# Patient Record
Sex: FEMALE | Race: WHITE | URBAN - METROPOLITAN AREA
[De-identification: names, ages, dates, MRNs, and addresses within clinical notes are randomized per-mention and may not be internally consistent; named-entity substitution may affect disease eponyms.]

---

## 2017-12-22 ENCOUNTER — INPATIENT (INPATIENT)
Facility: HOSPITAL | Age: 82
LOS: 4 days | Discharge: HOME HEALTH | End: 2017-12-27
Attending: HOSPITALIST | Admitting: HOSPITALIST
Payer: MEDICARE

## 2017-12-22 VITALS
OXYGEN SATURATION: 100 % | HEART RATE: 74 BPM | SYSTOLIC BLOOD PRESSURE: 162 MMHG | WEIGHT: 141.98 LBS | DIASTOLIC BLOOD PRESSURE: 94 MMHG | HEIGHT: 62 IN | RESPIRATION RATE: 18 BRPM

## 2017-12-22 LAB
ALBUMIN SERPL ELPH-MCNC: 3.5 G/DL — SIGNIFICANT CHANGE UP (ref 3.3–5)
ALP SERPL-CCNC: 89 U/L — SIGNIFICANT CHANGE UP (ref 40–120)
ALT FLD-CCNC: 23 U/L — SIGNIFICANT CHANGE UP (ref 12–78)
ANION GAP SERPL CALC-SCNC: 7 MMOL/L — SIGNIFICANT CHANGE UP (ref 5–17)
APTT BLD: 28.2 SEC — SIGNIFICANT CHANGE UP (ref 27.5–37.4)
AST SERPL-CCNC: 22 U/L — SIGNIFICANT CHANGE UP (ref 15–37)
BASOPHILS # BLD AUTO: 0.1 K/UL — SIGNIFICANT CHANGE UP (ref 0–0.2)
BASOPHILS NFR BLD AUTO: 0.6 % — SIGNIFICANT CHANGE UP (ref 0–2)
BILIRUB SERPL-MCNC: 0.4 MG/DL — SIGNIFICANT CHANGE UP (ref 0.2–1.2)
BUN SERPL-MCNC: 27 MG/DL — HIGH (ref 7–23)
CALCIUM SERPL-MCNC: 8.5 MG/DL — SIGNIFICANT CHANGE UP (ref 8.5–10.1)
CHLORIDE SERPL-SCNC: 107 MMOL/L — SIGNIFICANT CHANGE UP (ref 96–108)
CO2 SERPL-SCNC: 26 MMOL/L — SIGNIFICANT CHANGE UP (ref 22–31)
CREAT SERPL-MCNC: 0.97 MG/DL — SIGNIFICANT CHANGE UP (ref 0.5–1.3)
EOSINOPHIL # BLD AUTO: 0.1 K/UL — SIGNIFICANT CHANGE UP (ref 0–0.5)
EOSINOPHIL NFR BLD AUTO: 0.7 % — SIGNIFICANT CHANGE UP (ref 0–6)
GLUCOSE SERPL-MCNC: 87 MG/DL — SIGNIFICANT CHANGE UP (ref 70–99)
HCT VFR BLD CALC: 33.1 % — LOW (ref 34.5–45)
HGB BLD-MCNC: 10.4 G/DL — LOW (ref 11.5–15.5)
INR BLD: 1.08 RATIO — SIGNIFICANT CHANGE UP (ref 0.88–1.16)
LACTATE SERPL-SCNC: 1.3 MMOL/L — SIGNIFICANT CHANGE UP (ref 0.7–2)
LYMPHOCYTES # BLD AUTO: 1.5 K/UL — SIGNIFICANT CHANGE UP (ref 1–3.3)
LYMPHOCYTES # BLD AUTO: 12.1 % — LOW (ref 13–44)
MCHC RBC-ENTMCNC: 25.1 PG — LOW (ref 27–34)
MCHC RBC-ENTMCNC: 31.4 GM/DL — LOW (ref 32–36)
MCV RBC AUTO: 79.8 FL — LOW (ref 80–100)
MONOCYTES # BLD AUTO: 1 K/UL — HIGH (ref 0–0.9)
MONOCYTES NFR BLD AUTO: 8.2 % — SIGNIFICANT CHANGE UP (ref 2–14)
NEUTROPHILS # BLD AUTO: 9.8 K/UL — HIGH (ref 1.8–7.4)
NEUTROPHILS NFR BLD AUTO: 78.3 % — HIGH (ref 43–77)
PLATELET # BLD AUTO: 308 K/UL — SIGNIFICANT CHANGE UP (ref 150–400)
POTASSIUM SERPL-MCNC: 3.7 MMOL/L — SIGNIFICANT CHANGE UP (ref 3.5–5.3)
POTASSIUM SERPL-SCNC: 3.7 MMOL/L — SIGNIFICANT CHANGE UP (ref 3.5–5.3)
PROT SERPL-MCNC: 7.9 GM/DL — SIGNIFICANT CHANGE UP (ref 6–8.3)
PROTHROM AB SERPL-ACNC: 11.7 SEC — SIGNIFICANT CHANGE UP (ref 9.8–12.7)
RAPID RVP RESULT: DETECTED
RBC # BLD: 4.15 M/UL — SIGNIFICANT CHANGE UP (ref 3.8–5.2)
RBC # FLD: 14.6 % — HIGH (ref 10.3–14.5)
RSV RNA SPEC QL NAA+PROBE: DETECTED
SODIUM SERPL-SCNC: 140 MMOL/L — SIGNIFICANT CHANGE UP (ref 135–145)
WBC # BLD: 12.5 K/UL — HIGH (ref 3.8–10.5)
WBC # FLD AUTO: 12.5 K/UL — HIGH (ref 3.8–10.5)

## 2017-12-22 PROCEDURE — 99285 EMERGENCY DEPT VISIT HI MDM: CPT

## 2017-12-22 PROCEDURE — 71010: CPT | Mod: 26

## 2017-12-22 PROCEDURE — 93010 ELECTROCARDIOGRAM REPORT: CPT

## 2017-12-22 RX ORDER — AZITHROMYCIN 500 MG/1
500 TABLET, FILM COATED ORAL ONCE
Qty: 0 | Refills: 0 | Status: COMPLETED | OUTPATIENT
Start: 2017-12-22 | End: 2017-12-22

## 2017-12-22 RX ORDER — SODIUM CHLORIDE 9 MG/ML
3 INJECTION INTRAMUSCULAR; INTRAVENOUS; SUBCUTANEOUS ONCE
Qty: 0 | Refills: 0 | Status: COMPLETED | OUTPATIENT
Start: 2017-12-22 | End: 2017-12-22

## 2017-12-22 RX ORDER — PILOCARPINE HCL 4 %
1 DROPS OPHTHALMIC (EYE)
Qty: 0 | Refills: 0 | Status: DISCONTINUED | OUTPATIENT
Start: 2017-12-22 | End: 2017-12-27

## 2017-12-22 RX ORDER — LATANOPROST 0.05 MG/ML
1 SOLUTION/ DROPS OPHTHALMIC; TOPICAL AT BEDTIME
Qty: 0 | Refills: 0 | Status: DISCONTINUED | OUTPATIENT
Start: 2017-12-22 | End: 2017-12-27

## 2017-12-22 RX ORDER — IPRATROPIUM/ALBUTEROL SULFATE 18-103MCG
3 AEROSOL WITH ADAPTER (GRAM) INHALATION EVERY 6 HOURS
Qty: 0 | Refills: 0 | Status: DISCONTINUED | OUTPATIENT
Start: 2017-12-22 | End: 2017-12-27

## 2017-12-22 RX ORDER — ACETAMINOPHEN 500 MG
650 TABLET ORAL EVERY 6 HOURS
Qty: 0 | Refills: 0 | Status: DISCONTINUED | OUTPATIENT
Start: 2017-12-22 | End: 2017-12-27

## 2017-12-22 RX ORDER — ASPIRIN/CALCIUM CARB/MAGNESIUM 324 MG
81 TABLET ORAL DAILY
Qty: 0 | Refills: 0 | Status: DISCONTINUED | OUTPATIENT
Start: 2017-12-22 | End: 2017-12-27

## 2017-12-22 RX ORDER — CEFTRIAXONE 500 MG/1
1 INJECTION, POWDER, FOR SOLUTION INTRAMUSCULAR; INTRAVENOUS ONCE
Qty: 0 | Refills: 0 | Status: COMPLETED | OUTPATIENT
Start: 2017-12-22 | End: 2017-12-22

## 2017-12-22 RX ORDER — CEFTRIAXONE 500 MG/1
1000 INJECTION, POWDER, FOR SOLUTION INTRAMUSCULAR; INTRAVENOUS EVERY 24 HOURS
Qty: 0 | Refills: 0 | Status: DISCONTINUED | OUTPATIENT
Start: 2017-12-22 | End: 2017-12-27

## 2017-12-22 RX ORDER — IPRATROPIUM/ALBUTEROL SULFATE 18-103MCG
3 AEROSOL WITH ADAPTER (GRAM) INHALATION ONCE
Qty: 0 | Refills: 0 | Status: COMPLETED | OUTPATIENT
Start: 2017-12-22 | End: 2017-12-22

## 2017-12-22 RX ORDER — HEPARIN SODIUM 5000 [USP'U]/ML
5000 INJECTION INTRAVENOUS; SUBCUTANEOUS EVERY 8 HOURS
Qty: 0 | Refills: 0 | Status: DISCONTINUED | OUTPATIENT
Start: 2017-12-22 | End: 2017-12-27

## 2017-12-22 RX ORDER — AZITHROMYCIN 500 MG/1
500 TABLET, FILM COATED ORAL EVERY 24 HOURS
Qty: 0 | Refills: 0 | Status: DISCONTINUED | OUTPATIENT
Start: 2017-12-22 | End: 2017-12-27

## 2017-12-22 RX ORDER — DORZOLAMIDE HYDROCHLORIDE TIMOLOL MALEATE 20; 5 MG/ML; MG/ML
1 SOLUTION/ DROPS OPHTHALMIC
Qty: 0 | Refills: 0 | Status: DISCONTINUED | OUTPATIENT
Start: 2017-12-22 | End: 2017-12-27

## 2017-12-22 RX ORDER — SODIUM CHLORIDE 9 MG/ML
1000 INJECTION INTRAMUSCULAR; INTRAVENOUS; SUBCUTANEOUS
Qty: 0 | Refills: 0 | Status: DISCONTINUED | OUTPATIENT
Start: 2017-12-22 | End: 2017-12-25

## 2017-12-22 RX ORDER — SIMVASTATIN 20 MG/1
20 TABLET, FILM COATED ORAL AT BEDTIME
Qty: 0 | Refills: 0 | Status: DISCONTINUED | OUTPATIENT
Start: 2017-12-22 | End: 2017-12-27

## 2017-12-22 RX ORDER — CEFTRIAXONE 500 MG/1
INJECTION, POWDER, FOR SOLUTION INTRAMUSCULAR; INTRAVENOUS
Qty: 0 | Refills: 0 | Status: DISCONTINUED | OUTPATIENT
Start: 2017-12-22 | End: 2017-12-22

## 2017-12-22 RX ADMIN — Medication 40 MILLIGRAM(S): at 18:18

## 2017-12-22 RX ADMIN — CEFTRIAXONE 1000 MILLIGRAM(S): 500 INJECTION, POWDER, FOR SOLUTION INTRAMUSCULAR; INTRAVENOUS at 16:15

## 2017-12-22 RX ADMIN — LATANOPROST 1 DROP(S): 0.05 SOLUTION/ DROPS OPHTHALMIC; TOPICAL at 22:51

## 2017-12-22 RX ADMIN — CEFTRIAXONE 100 GRAM(S): 500 INJECTION, POWDER, FOR SOLUTION INTRAMUSCULAR; INTRAVENOUS at 13:43

## 2017-12-22 RX ADMIN — SODIUM CHLORIDE 50 MILLILITER(S): 9 INJECTION INTRAMUSCULAR; INTRAVENOUS; SUBCUTANEOUS at 18:17

## 2017-12-22 RX ADMIN — Medication 3 MILLILITER(S): at 20:05

## 2017-12-22 RX ADMIN — SODIUM CHLORIDE 50 MILLILITER(S): 9 INJECTION INTRAMUSCULAR; INTRAVENOUS; SUBCUTANEOUS at 22:51

## 2017-12-22 RX ADMIN — Medication 3 MILLILITER(S): at 11:53

## 2017-12-22 RX ADMIN — HEPARIN SODIUM 5000 UNIT(S): 5000 INJECTION INTRAVENOUS; SUBCUTANEOUS at 22:52

## 2017-12-22 RX ADMIN — AZITHROMYCIN 255 MILLIGRAM(S): 500 TABLET, FILM COATED ORAL at 16:15

## 2017-12-22 RX ADMIN — SODIUM CHLORIDE 3 MILLILITER(S): 9 INJECTION INTRAMUSCULAR; INTRAVENOUS; SUBCUTANEOUS at 11:49

## 2017-12-22 RX ADMIN — AZITHROMYCIN 255 MILLIGRAM(S): 500 TABLET, FILM COATED ORAL at 13:59

## 2017-12-22 RX ADMIN — Medication 125 MILLIGRAM(S): at 15:46

## 2017-12-22 RX ADMIN — SIMVASTATIN 20 MILLIGRAM(S): 20 TABLET, FILM COATED ORAL at 22:51

## 2017-12-22 NOTE — ED ADULT TRIAGE NOTE - CHIEF COMPLAINT QUOTE
Pt. to the ED BIBA C/O Difficulty Breathing- Pt. states she was recently diagnosed with respiratory infection and was placed on PO Z-Pack and Prednisone- Pt. denies CP

## 2017-12-22 NOTE — ED PROVIDER NOTE - OBJECTIVE STATEMENT
89F pmhx cancer presents to ED BIBA c/o SOB since yesterday. States being recently diagnosed with respiratory infection and placed on PO Z-Pack and Prednisone. Pt has no other complaints and denies CP, fever/chills, n/v/d and HA. PMD Sinida 89F pmhx cancer presents to ED BIBA c/o SOB since yesterday. States being recently diagnosed with respiratory infection and placed on PO Z-Pack and Prednisone. Pt has no other complaints and denies CP, fever/chills, n/v/d and HA. PMD tyrell

## 2017-12-22 NOTE — H&P ADULT - NSHPPHYSICALEXAM_GEN_ALL_CORE
PHYSICAL EXAM:    Constitutional: NAD, awake and alert, well-developed  HEENT: PERR, EOMI, Normal Hearing, MMM  Neck: Soft and supple, No LAD, No JVD  Respiratory: diminshed breath sound to left base  Cardiovascular: S1 and S2, regular rate and rhythm, no Murmurs, gallops or rubs  Gastrointestinal: Bowel Sounds present, soft, nontender, nondistended, no guarding, no rebound  Extremities: No peripheral edema  Vascular: 2+ peripheral pulses  Neurological: A/O x 3, no focal deficits  Musculoskeletal: 5/5 strength b/l upper and lower extremities  Skin: No rashes

## 2017-12-22 NOTE — H&P ADULT - NSHPLABSRESULTS_GEN_ALL_CORE
LABS: All Labs Reviewed:                        10.4   12.5  )-----------( 308      ( 22 Dec 2017 11:02 )             33.1     12-22    140  |  107  |  27<H>  ----------------------------<  87  3.7   |  26  |  0.97    Ca    8.5      22 Dec 2017 11:02    TPro  7.9  /  Alb  3.5  /  TBili  0.4  /  DBili  x   /  AST  22  /  ALT  23  /  AlkPhos  89  12-22    PT/INR - ( 22 Dec 2017 11:02 )   PT: 11.7 sec;   INR: 1.08 ratio         PTT - ( 22 Dec 2017 11:02 )  PTT:28.2 sec          Blood Culture:             < from: Xray Chest 1 View AP/PA. (12.22.17 @ 12:29) >      IMPRESSION:  Interval development of infiltrate and/or partial atelectasis in the left   lower lobe. Left lower lobe pneumonia is the consideration and clinical   correlation is recommended.    < end of copied text >

## 2017-12-22 NOTE — H&P ADULT - HISTORY OF PRESENT ILLNESS
89 yo F with pmh HTN p/w dyspnea x 1 day. Was recently seen by pmd for URI and was started on Z-pack for the same along with prednisone. No fevers, chills, nausea or vomiting.   CXR c/w LLL infiltrate and started on empiric abx in ED.     ED course: s/p rocephin and zithromax  social: no toxic habits  Fhx: nc

## 2017-12-22 NOTE — H&P ADULT - ASSESSMENT
89 yo F with pmh HTN p/w dyspnea x 1 day. Was recently seen by pmd for URI and was started on Z-pack for the same along with prednisone. No fevers, chills, nausea or vomiting.   CXR c/w LLL infiltrate and started on empiric abx in ED.         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 isolation  Blood culture  anti-pyretics, supplemental O2

## 2017-12-22 NOTE — ED ADULT NURSE NOTE - CHPI ED SYMPTOMS NEG
no body aches/no fever/no shortness of breath/no diaphoresis/no headache/no chest pain/no chills/no hemoptysis/no edema

## 2017-12-22 NOTE — ED PROVIDER NOTE - PROGRESS NOTE DETAILS
rvp + for rsv, pt with pneumonia and wheezing, needing oxygen for desturation to 92 to 94 on ra, tba

## 2017-12-23 LAB
APPEARANCE UR: CLEAR — SIGNIFICANT CHANGE UP
BACTERIA # UR AUTO: (no result)
BILIRUB UR-MCNC: NEGATIVE — SIGNIFICANT CHANGE UP
COLOR SPEC: YELLOW — SIGNIFICANT CHANGE UP
DIFF PNL FLD: NEGATIVE — SIGNIFICANT CHANGE UP
EPI CELLS # UR: (no result)
GLUCOSE UR QL: NEGATIVE MG/DL — SIGNIFICANT CHANGE UP
HCT VFR BLD CALC: 28.1 % — LOW (ref 34.5–45)
HGB BLD-MCNC: 8.9 G/DL — LOW (ref 11.5–15.5)
KETONES UR-MCNC: NEGATIVE — SIGNIFICANT CHANGE UP
LEUKOCYTE ESTERASE UR-ACNC: (no result)
MCHC RBC-ENTMCNC: 25 PG — LOW (ref 27–34)
MCHC RBC-ENTMCNC: 31.5 GM/DL — LOW (ref 32–36)
MCV RBC AUTO: 79.5 FL — LOW (ref 80–100)
NITRITE UR-MCNC: POSITIVE
PH UR: 6 — SIGNIFICANT CHANGE UP (ref 5–8)
PLATELET # BLD AUTO: 272 K/UL — SIGNIFICANT CHANGE UP (ref 150–400)
PROT UR-MCNC: NEGATIVE MG/DL — SIGNIFICANT CHANGE UP
RBC # BLD: 3.54 M/UL — LOW (ref 3.8–5.2)
RBC # FLD: 14.6 % — HIGH (ref 10.3–14.5)
RBC CASTS # UR COMP ASSIST: (no result) /HPF (ref 0–4)
SP GR SPEC: 1.01 — SIGNIFICANT CHANGE UP (ref 1.01–1.02)
UROBILINOGEN FLD QL: NEGATIVE MG/DL — SIGNIFICANT CHANGE UP
WBC # BLD: 11.3 K/UL — HIGH (ref 3.8–10.5)
WBC # FLD AUTO: 11.3 K/UL — HIGH (ref 3.8–10.5)
WBC UR QL: >50

## 2017-12-23 RX ORDER — DOCUSATE SODIUM 100 MG
100 CAPSULE ORAL
Qty: 0 | Refills: 0 | Status: DISCONTINUED | OUTPATIENT
Start: 2017-12-23 | End: 2017-12-27

## 2017-12-23 RX ADMIN — Medication 3 MILLILITER(S): at 15:03

## 2017-12-23 RX ADMIN — DORZOLAMIDE HYDROCHLORIDE TIMOLOL MALEATE 1 DROP(S): 20; 5 SOLUTION/ DROPS OPHTHALMIC at 17:06

## 2017-12-23 RX ADMIN — CEFTRIAXONE 1000 MILLIGRAM(S): 500 INJECTION, POWDER, FOR SOLUTION INTRAMUSCULAR; INTRAVENOUS at 16:30

## 2017-12-23 RX ADMIN — DORZOLAMIDE HYDROCHLORIDE TIMOLOL MALEATE 1 DROP(S): 20; 5 SOLUTION/ DROPS OPHTHALMIC at 05:31

## 2017-12-23 RX ADMIN — Medication 81 MILLIGRAM(S): at 11:19

## 2017-12-23 RX ADMIN — HEPARIN SODIUM 5000 UNIT(S): 5000 INJECTION INTRAVENOUS; SUBCUTANEOUS at 13:35

## 2017-12-23 RX ADMIN — SODIUM CHLORIDE 50 MILLILITER(S): 9 INJECTION INTRAMUSCULAR; INTRAVENOUS; SUBCUTANEOUS at 11:23

## 2017-12-23 RX ADMIN — Medication 600 MILLIGRAM(S): at 17:05

## 2017-12-23 RX ADMIN — HEPARIN SODIUM 5000 UNIT(S): 5000 INJECTION INTRAVENOUS; SUBCUTANEOUS at 05:31

## 2017-12-23 RX ADMIN — Medication 3 MILLILITER(S): at 01:07

## 2017-12-23 RX ADMIN — Medication 40 MILLIGRAM(S): at 05:30

## 2017-12-23 RX ADMIN — SIMVASTATIN 20 MILLIGRAM(S): 20 TABLET, FILM COATED ORAL at 22:45

## 2017-12-23 RX ADMIN — Medication 1 DROP(S): at 05:31

## 2017-12-23 RX ADMIN — Medication 100 MILLIGRAM(S): at 17:05

## 2017-12-23 RX ADMIN — HEPARIN SODIUM 5000 UNIT(S): 5000 INJECTION INTRAVENOUS; SUBCUTANEOUS at 22:45

## 2017-12-23 RX ADMIN — Medication 40 MILLIGRAM(S): at 17:05

## 2017-12-23 RX ADMIN — Medication 1 DROP(S): at 17:06

## 2017-12-23 RX ADMIN — Medication 3 MILLILITER(S): at 20:32

## 2017-12-23 RX ADMIN — AZITHROMYCIN 255 MILLIGRAM(S): 500 TABLET, FILM COATED ORAL at 16:30

## 2017-12-23 RX ADMIN — LATANOPROST 1 DROP(S): 0.05 SOLUTION/ DROPS OPHTHALMIC; TOPICAL at 22:47

## 2017-12-23 RX ADMIN — Medication 3 MILLILITER(S): at 08:48

## 2017-12-23 NOTE — PROGRESS NOTE ADULT - SUBJECTIVE AND OBJECTIVE BOX
CC: Cough (22 Dec 2017 16:39)    HPI:  89 yo F with pmh HTN p/w dyspnea x 1 day. Was recently seen by pmd for URI and was started on Z-pack for the same along with prednisone. No fevers, chills, nausea or vomiting.   CXR c/w LLL infiltrate and started on empiric abx in ED.     ED course: s/p rocephin and zithromax  social: no toxic habits  Fhx: nc (22 Dec 2017 15:10)    INTERVAL HPI/OVERNIGHT EVENTS:    Vital Signs Last 24 Hrs  T(C): 36.6 (23 Dec 2017 11:55), Max: 36.7 (22 Dec 2017 13:48)  T(F): 97.8 (23 Dec 2017 11:55), Max: 98 (22 Dec 2017 13:48)  HR: 78 (23 Dec 2017 11:55) (74 - 98)  BP: 160/77 (23 Dec 2017 11:55) (112/59 - 160/77)  BP(mean): --  RR: 17 (23 Dec 2017 11:55) (17 - 20)  SpO2: 97% (23 Dec 2017 11:55) (94% - 100%)  I&O's Detail    REVIEW OF SYSTEMS:    CONSTITUTIONAL: No weakness, fevers or chills  EYES/ENT: No visual changes;  No vertigo or throat pain   NECK: No pain or stiffness  RESPIRATORY: No cough, wheezing, hemoptysis; No shortness of breath  CARDIOVASCULAR: No chest pain or palpitations  GASTROINTESTINAL: No abdominal or epigastric pain. No nausea, vomiting, or hematemesis; No diarrhea or constipation. No melena or hematochezia.  GENITOURINARY: No dysuria, frequency or hematuria  NEUROLOGICAL: No numbness or weakness  SKIN: No itching, burning, rashes, or lesions   All other review of systems is negative unless indicated above.  PHYSICAL EXAM:    General: Well developed; well nourished; in no acute distress  Eyes: PERRLA, EOMI; conjunctiva and sclera clear  Head: Normocephalic; atraumatic  ENMT: No nasal discharge; airway clear  Neck: Supple; non tender; no masses  Respiratory: No wheezes, rales or rhonchi  Cardiovascular: Regular rate and rhythm. S1 and S2 Normal; No murmurs, gallops or rubs  Gastrointestinal: Soft non-tender non-distended; Normal bowel sounds  Genitourinary: No costovertebral angle tenderness  Extremities: Normal range of motion, No clubbing, cyanosis or edema  Vascular: Peripheral pulses palpable 2+ bilaterally  Neurological: Alert and oriented x4  Skin: Warm and dry. No acute rash  Lymph Nodes: No acute cervical adenopathy  Musculoskeletal: Normal gait, tone, without deformities  Psychiatric: Cooperative and appropriate                            8.9    11.3  )-----------( 272      ( 23 Dec 2017 07:32 )             28.1     22 Dec 2017 11:02    140    |  107    |  27     ----------------------------<  87     3.7     |  26     |  0.97     Ca    8.5        22 Dec 2017 11:02    TPro  7.9    /  Alb  3.5    /  TBili  0.4    /  DBili  x      /  AST  22     /  ALT  23     /  AlkPhos  89     22 Dec 2017 11:02    PT/INR - ( 22 Dec 2017 11:02 )   PT: 11.7 sec;   INR: 1.08 ratio         PTT - ( 22 Dec 2017 11:02 )  PTT:28.2 sec  CAPILLARY BLOOD GLUCOSE        LIVER FUNCTIONS - ( 22 Dec 2017 11:02 )  Alb: 3.5 g/dL / Pro: 7.9 gm/dL / ALK PHOS: 89 U/L / ALT: 23 U/L / AST: 22 U/L / GGT: x               MEDICATIONS  (STANDING):  ALBUTerol/ipratropium for Nebulization 3 milliLiter(s) Nebulizer every 6 hours  aspirin  chewable 81 milliGRAM(s) Oral daily  azithromycin  IVPB 500 milliGRAM(s) IV Intermittent every 24 hours  cefTRIAXone Injectable 1000 milliGRAM(s) IV Push every 24 hours  dorzolamide 2%/timolol 0.5% Ophthalmic Solution 1 Drop(s) Both EYES two times a day  heparin  Injectable 5000 Unit(s) SubCutaneous every 8 hours  latanoprost 0.005% Ophthalmic Solution 1 Drop(s) Both EYES at bedtime  methylPREDNISolone sodium succinate Injectable 40 milliGRAM(s) IV Push two times a day  pilocarpine 4% Solution 1 Drop(s) Both EYES two times a day  simvastatin 20 milliGRAM(s) Oral at bedtime  sodium chloride 0.9%. 1000 milliLiter(s) (50 mL/Hr) IV Continuous <Continuous>    MEDICATIONS  (PRN):  acetaminophen   Tablet 650 milliGRAM(s) Oral every 6 hours PRN For Temp greater than 38 C (100.4 F)      RADIOLOGY & ADDITIONAL TESTS: CC: Cough (22 Dec 2017 16:39)    HPI:  89 yo F with pmh HTN p/w dyspnea x 1 day. Was recently seen by pmd for URI and was started on Z-pack for the same along with prednisone. No fevers, chills, nausea or vomiting.   CXR c/w LLL infiltrate and started on empiric abx in ED.     INTERVAL HPI/ OVERNIGHT EVENTS: Chart reviewed, Pt was seen and examined, confirms history but distracted jumps on different topics, mildly confused. Still with Cough, reports overall feels little better. Afebrile     Vital Signs Last 24 Hrs  T(C): 36.6 (23 Dec 2017 11:55), Max: 36.7 (22 Dec 2017 13:48)  T(F): 97.8 (23 Dec 2017 11:55), Max: 98 (22 Dec 2017 13:48)  HR: 78 (23 Dec 2017 11:55) (74 - 98)  BP: 160/77 (23 Dec 2017 11:55) (112/59 - 160/77)  RR: 17 (23 Dec 2017 11:55) (17 - 20)  SpO2: 97% (23 Dec 2017 11:55) (94% - 100%)      REVIEW OF SYSTEMS:  All other review of systems is negative unless indicated above.      PHYSICAL EXAM:  General: Well developed; malnourished elderly female frail looking,  in no acute distress  Eyes: PERRLA, EOMI; conjunctiva and sclera clear  Head: Normocephalic; atraumatic  ENMT: No nasal discharge; airway clear  Neck: Supple; no  LAD  Respiratory: decreased BS at LLL,  scattered wheezes,  No rales or rhonchi  Cardiovascular: Regular rate and rhythm. S1 and S2 Normal; No murmurs  Gastrointestinal: Soft non-tender non-distended; Normal bowel sounds  Genitourinary: No costovertebral angle tenderness, no suprapubic tenderness   Extremities: Preserved range of motion, No  edema  Vascular: Peripheral pulses palpable 2+ bilaterally  Neurological: Alert and oriented x3,   Skin: Warm and dry. No acute rash, Chronic skin changes   Musculoskeletal: Normal  tone, without deformities  Psychiatric: Cooperative and appropriate      LABS:                  8.9    11.3  )-----------( 272      ( 23 Dec 2017 07:32 )             28.1     22 Dec 2017 11:02    140    |  107    |  27     ----------------------------<  87     3.7     |  26     |  0.97     Ca    8.5        22 Dec 2017 11:02    TPro  7.9    /  Alb  3.5    /  TBili  0.4    /  DBili  x      /  AST  22     /  ALT  23     /  AlkPhos  89     22 Dec 2017 11:0  PT/INR - ( 22 Dec 2017 11:02 )   PT: 11.7 sec;   INR: 1.08 ratio    PTT - ( 22 Dec 2017 11:02 )  PTT:28.2 sec  LIVER FUNCTIONS - ( 22 Dec 2017 11:02 )  Alb: 3.5 g/dL / Pro: 7.9 gm/dL / ALK PHOS: 89 U/L / ALT: 23 U/L / AST: 22 U/L / GGT: x               MEDICATIONS  (STANDING):  ALBUTerol/ipratropium for Nebulization 3 milliLiter(s) Nebulizer every 6 hours  aspirin  chewable 81 milliGRAM(s) Oral daily  azithromycin  IVPB 500 milliGRAM(s) IV Intermittent every 24 hours  cefTRIAXone Injectable 1000 milliGRAM(s) IV Push every 24 hours  dorzolamide 2%/timolol 0.5% Ophthalmic Solution 1 Drop(s) Both EYES two times a day  heparin  Injectable 5000 Unit(s) SubCutaneous every 8 hours  latanoprost 0.005% Ophthalmic Solution 1 Drop(s) Both EYES at bedtime  methylPREDNISolone sodium succinate Injectable 40 milliGRAM(s) IV Push two times a day  pilocarpine 4% Solution 1 Drop(s) Both EYES two times a day  simvastatin 20 milliGRAM(s) Oral at bedtime  sodium chloride 0.9%. 1000 milliLiter(s) (50 mL/Hr) IV Continuous <Continuous>    MEDICATIONS  (PRN):  acetaminophen   Tablet 650 milliGRAM(s) Oral every 6 hours PRN For Temp greater than 38 C (100.4 F)      RADIOLOGY & ADDITIONAL TESTS:    < from: Xray Chest 1 View AP/PA. (12.22.17 @ 12:29) >  EXAM:  CHEST SINGLE VIEW FRONTAL                            PROCEDURE DATE:  12/22/2017          INTERPRETATION:  Portable chest radiograph dated 12/22/17.    COMPARISON: 7/27/2014.    CLINICAL INFORMATION: SOB.    FINDINGS:    The airway is midline.  Interval development of partial atelectasis//infiltration involving the   left lower lobe. The remainder of the lungs are clear.  There is no pleural effusion or pneumothorax.   Cardiomegaly atherosclerotic aorta are again noted.  The bones are normal.   Surgical clips in the left axilla. A hiatal hernia.      IMPRESSION:  Interval development of infiltrate and/or partial atelectasis in the left   lower lobe. Left lower lobe pneumonia is the consideration and clinical   correlation is recommended.

## 2017-12-23 NOTE — PROGRESS NOTE ADULT - ASSESSMENT
89 yo F with pmh HTN p/w dyspnea x 1 day. Was recently seen by pmd for URI and was started on Z-pack for the same along with prednisone. No fevers, chills, nausea or vomiting.   CXR c/w LLL infiltrate and started on empiric abx in ED.         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 isolation  Blood culture  anti-pyretics, supplemental O2 89 yo F with pmh HTN, HLDp/w dyspnea x 1 day.     A/P:   1. Dyspnea 2/2 to CAP/ LLL PNA  with superimposed RSV Bronchitis  No signs of sepsis, not febrile  Tolerates RA at rest, monitor pulse ox  F/u BCX  Sputum Cx  Musinex for cough   C/w Azithromycin and Rocephin  Neb Tx      2. HTN  not on meds  monitor     3. HLD  c/w statin    4. Microcytic Anemia  unclear etiology  will send anemia work up  Monitor H/H       5. Hx of colon CA and Breast CA  stable       6. DVT PPXs 89 yo F with pmh HTN, HLDp/w dyspnea x 1 day.     A/P:   1. Dyspnea 2/2 to CAP/ LLL PNA  with superimposed RSV Bronchitis with Bronchospasm  No signs of sepsis, not febrile  Tolerates RA at rest, monitor pulse ox  F/u BCX  Sputum Cx  Musinex for cough   C/w Azithromycin and Rocephin  - C/w Solumedrol IV  Neb Tx      2. HTN  not on meds  monitor     3. HLD  c/w statin    4. Microcytic Anemia  unclear etiology  will send anemia work up  Monitor H/H       5. Dehydration  Monitor Oral intake and gentle IVF       6. Hx of colon CA and Breast CA  stable     7. DVT PPXs

## 2017-12-24 LAB
ANION GAP SERPL CALC-SCNC: 10 MMOL/L — SIGNIFICANT CHANGE UP (ref 5–17)
BUN SERPL-MCNC: 31 MG/DL — HIGH (ref 7–23)
CALCIUM SERPL-MCNC: 7.8 MG/DL — LOW (ref 8.5–10.1)
CHLORIDE SERPL-SCNC: 113 MMOL/L — HIGH (ref 96–108)
CO2 SERPL-SCNC: 19 MMOL/L — LOW (ref 22–31)
CREAT SERPL-MCNC: 0.89 MG/DL — SIGNIFICANT CHANGE UP (ref 0.5–1.3)
FERRITIN SERPL-MCNC: 12 NG/ML — LOW (ref 15–150)
FOLATE SERPL-MCNC: 6.2 NG/ML — SIGNIFICANT CHANGE UP (ref 4.8–24.2)
GLUCOSE SERPL-MCNC: 103 MG/DL — HIGH (ref 70–99)
HCT VFR BLD CALC: 27.1 % — LOW (ref 34.5–45)
HGB BLD-MCNC: 8.2 G/DL — LOW (ref 11.5–15.5)
IRON SATN MFR SERPL: 13 UG/DL — LOW (ref 30–160)
IRON SATN MFR SERPL: 5 % — LOW (ref 14–50)
LDH SERPL L TO P-CCNC: 161 U/L — SIGNIFICANT CHANGE UP (ref 84–241)
MCHC RBC-ENTMCNC: 24 PG — LOW (ref 27–34)
MCHC RBC-ENTMCNC: 30.4 GM/DL — LOW (ref 32–36)
MCV RBC AUTO: 79 FL — LOW (ref 80–100)
PLATELET # BLD AUTO: 258 K/UL — SIGNIFICANT CHANGE UP (ref 150–400)
POTASSIUM SERPL-MCNC: 4.2 MMOL/L — SIGNIFICANT CHANGE UP (ref 3.5–5.3)
POTASSIUM SERPL-SCNC: 4.2 MMOL/L — SIGNIFICANT CHANGE UP (ref 3.5–5.3)
RBC # BLD: 3.35 M/UL — LOW (ref 3.8–5.2)
RBC # BLD: 3.43 M/UL — LOW (ref 3.8–5.2)
RBC # FLD: 14.6 % — HIGH (ref 10.3–14.5)
RETICS #: 71.4 K/UL — SIGNIFICANT CHANGE UP (ref 25–125)
RETICS/RBC NFR: 2.1 % — SIGNIFICANT CHANGE UP (ref 0.5–2.5)
SODIUM SERPL-SCNC: 142 MMOL/L — SIGNIFICANT CHANGE UP (ref 135–145)
TIBC SERPL-MCNC: 274 UG/DL — SIGNIFICANT CHANGE UP (ref 220–430)
UIBC SERPL-MCNC: 261 UG/DL — SIGNIFICANT CHANGE UP (ref 110–370)
VIT B12 SERPL-MCNC: 254 PG/ML — SIGNIFICANT CHANGE UP (ref 232–1245)
WBC # BLD: 14 K/UL — HIGH (ref 3.8–10.5)
WBC # FLD AUTO: 14 K/UL — HIGH (ref 3.8–10.5)

## 2017-12-24 RX ORDER — FERROUS SULFATE 325(65) MG
325 TABLET ORAL
Qty: 0 | Refills: 0 | Status: DISCONTINUED | OUTPATIENT
Start: 2017-12-24 | End: 2017-12-27

## 2017-12-24 RX ORDER — PANTOPRAZOLE SODIUM 20 MG/1
40 TABLET, DELAYED RELEASE ORAL
Qty: 0 | Refills: 0 | Status: DISCONTINUED | OUTPATIENT
Start: 2017-12-24 | End: 2017-12-27

## 2017-12-24 RX ORDER — POLYETHYLENE GLYCOL 3350 17 G/17G
17 POWDER, FOR SOLUTION ORAL DAILY
Qty: 0 | Refills: 0 | Status: DISCONTINUED | OUTPATIENT
Start: 2017-12-24 | End: 2017-12-27

## 2017-12-24 RX ORDER — PREGABALIN 225 MG/1
1000 CAPSULE ORAL DAILY
Qty: 0 | Refills: 0 | Status: DISCONTINUED | OUTPATIENT
Start: 2017-12-24 | End: 2017-12-27

## 2017-12-24 RX ADMIN — Medication 1 DROP(S): at 06:13

## 2017-12-24 RX ADMIN — AZITHROMYCIN 255 MILLIGRAM(S): 500 TABLET, FILM COATED ORAL at 17:27

## 2017-12-24 RX ADMIN — Medication 325 MILLIGRAM(S): at 17:30

## 2017-12-24 RX ADMIN — POLYETHYLENE GLYCOL 3350 17 GRAM(S): 17 POWDER, FOR SOLUTION ORAL at 18:58

## 2017-12-24 RX ADMIN — HEPARIN SODIUM 5000 UNIT(S): 5000 INJECTION INTRAVENOUS; SUBCUTANEOUS at 13:42

## 2017-12-24 RX ADMIN — Medication 40 MILLIGRAM(S): at 06:12

## 2017-12-24 RX ADMIN — Medication 3 MILLILITER(S): at 19:56

## 2017-12-24 RX ADMIN — Medication 81 MILLIGRAM(S): at 11:27

## 2017-12-24 RX ADMIN — PREGABALIN 1000 MICROGRAM(S): 225 CAPSULE ORAL at 18:59

## 2017-12-24 RX ADMIN — SODIUM CHLORIDE 50 MILLILITER(S): 9 INJECTION INTRAMUSCULAR; INTRAVENOUS; SUBCUTANEOUS at 08:25

## 2017-12-24 RX ADMIN — Medication 1 DROP(S): at 17:32

## 2017-12-24 RX ADMIN — SIMVASTATIN 20 MILLIGRAM(S): 20 TABLET, FILM COATED ORAL at 23:06

## 2017-12-24 RX ADMIN — Medication 40 MILLIGRAM(S): at 17:31

## 2017-12-24 RX ADMIN — DORZOLAMIDE HYDROCHLORIDE TIMOLOL MALEATE 1 DROP(S): 20; 5 SOLUTION/ DROPS OPHTHALMIC at 06:14

## 2017-12-24 RX ADMIN — Medication 600 MILLIGRAM(S): at 17:30

## 2017-12-24 RX ADMIN — Medication 3 MILLILITER(S): at 14:16

## 2017-12-24 RX ADMIN — Medication 3 MILLILITER(S): at 01:42

## 2017-12-24 RX ADMIN — Medication 3 MILLILITER(S): at 09:17

## 2017-12-24 RX ADMIN — Medication 100 MILLIGRAM(S): at 17:30

## 2017-12-24 RX ADMIN — LATANOPROST 1 DROP(S): 0.05 SOLUTION/ DROPS OPHTHALMIC; TOPICAL at 23:35

## 2017-12-24 RX ADMIN — CEFTRIAXONE 1000 MILLIGRAM(S): 500 INJECTION, POWDER, FOR SOLUTION INTRAMUSCULAR; INTRAVENOUS at 17:30

## 2017-12-24 RX ADMIN — DORZOLAMIDE HYDROCHLORIDE TIMOLOL MALEATE 1 DROP(S): 20; 5 SOLUTION/ DROPS OPHTHALMIC at 17:32

## 2017-12-24 RX ADMIN — Medication 600 MILLIGRAM(S): at 06:11

## 2017-12-24 RX ADMIN — HEPARIN SODIUM 5000 UNIT(S): 5000 INJECTION INTRAVENOUS; SUBCUTANEOUS at 23:05

## 2017-12-24 RX ADMIN — HEPARIN SODIUM 5000 UNIT(S): 5000 INJECTION INTRAVENOUS; SUBCUTANEOUS at 06:12

## 2017-12-24 NOTE — PROGRESS NOTE ADULT - ASSESSMENT
89 yo F with pmh HTN, HLDp/w dyspnea x 1 day.     A/P:   1. Dyspnea 2/2 to CAP/ LLL PNA  with superimposed RSV Bronchitis with Bronchospasm  No signs of sepsis, not febrile  Tolerates RA at rest, monitor pulse ox  F/u BCX  Sputum Cx  Musinex for cough   C/w Azithromycin and Rocephin  - C/w Solumedrol IV  Neb Tx      2. HTN  not on meds  monitor     3. HLD  c/w statin    4. Microcytic Anemia  unclear etiology  will send anemia work up  Monitor H/H       5. Dehydration  Monitor Oral intake and gentle IVF       6. Hx of colon CA and Breast CA  stable     7. DVT PPXs 89 yo F with pmh HTN, HLDp/w dyspnea x 1 day.     A/P:   1. Dyspnea 2/2 to CAP/ LLL PNA  with superimposed RSV Bronchitis with Bronchospasm  No signs of sepsis, not febrile  Leukocytosis  now likely due to steroids   Tolerates RA at rest, monitor pulse ox  F/u BCX: NGTD  Sputum Cx  Musinex for cough   C/w Azithromycin and Rocephin  C/w Solumedrol IV  Neb Tx      2. HTN  not on meds  BP elevated, will start low dose amlodipine     3. HLD  c/w statin    4. Microcytic Anemia  unclear etiology  anemia work up sent   HAs B12 low, Iron studies with mixed picture   Monitor H/H  closely  Occult blood pending       5. Dehydration  Monitor Oral intake and gentle IVF       6. Hx of colon CA and Breast CA  stable     7. DVT PPXs 89 yo F with pmh HTN, HLDp/w dyspnea x 1 day.     A/P:   1. Dyspnea 2/2 to CAP/ LLL PNA  with superimposed RSV Bronchitis with Bronchospasm  No signs of sepsis, not febrile  Leukocytosis  now likely due to steroids   Tolerates RA at rest, monitor pulse ox  F/u BCX: NGTD  Sputum Cx  Musinex for cough   C/w Azithromycin and Rocephin  C/w Solumedrol IV  Neb Tx      2. HTN  not on meds  BP elevated, will start low dose amlodipine     3. HLD  c/w statin    4. Microcytic Anemia  unclear etiology  anemia work up sent   HAs B12 low, Iron studies with mixed picture   Monitor H/H  closely  Occult blood pending       5. Dehydration  Monitor Oral intake and gentle IVF       6. Hx of colon CA and Breast CA  stable     7.  Leukocytosis  Viral vs UTI? vs 2/2 steroids  Monitor for fevers  C/w ABxs  Will start taper down steroids     8. DVT PPXs

## 2017-12-24 NOTE — PROGRESS NOTE ADULT - SUBJECTIVE AND OBJECTIVE BOX
CC: Cough (22 Dec 2017 16:39)    HPI:  89 yo F with pmh HTN p/w dyspnea x 1 day. Was recently seen by pmd for URI and was started on Z-pack for the same along with prednisone. No fevers, chills, nausea or vomiting.   CXR c/w LLL infiltrate and started on empiric abx in ED.     INTERVAL HPI/ OVERNIGHT EVENTS: Chart reviewed, Pt was seen and examined, confirms history but distracted jumps on different topics, mildly confused. Still with Cough, reports overall feels little better. Afebrile     Vital Signs Last 24 Hrs  T(C): 36.6 (23 Dec 2017 11:55), Max: 36.7 (22 Dec 2017 13:48)  T(F): 97.8 (23 Dec 2017 11:55), Max: 98 (22 Dec 2017 13:48)  HR: 78 (23 Dec 2017 11:55) (74 - 98)  BP: 160/77 (23 Dec 2017 11:55) (112/59 - 160/77)  RR: 17 (23 Dec 2017 11:55) (17 - 20)  SpO2: 97% (23 Dec 2017 11:55) (94% - 100%)      REVIEW OF SYSTEMS:  All other review of systems is negative unless indicated above.      PHYSICAL EXAM:  General: Well developed; malnourished elderly female frail looking,  in no acute distress  Eyes: PERRLA, EOMI; conjunctiva and sclera clear  Head: Normocephalic; atraumatic  ENMT: No nasal discharge; airway clear  Neck: Supple; no  LAD  Respiratory: decreased BS at LLL,  scattered wheezes,  No rales or rhonchi  Cardiovascular: Regular rate and rhythm. S1 and S2 Normal; No murmurs  Gastrointestinal: Soft non-tender non-distended; Normal bowel sounds  Genitourinary: No costovertebral angle tenderness, no suprapubic tenderness   Extremities: Preserved range of motion, No  edema  Vascular: Peripheral pulses palpable 2+ bilaterally  Neurological: Alert and oriented x3,   Skin: Warm and dry. No acute rash, Chronic skin changes   Musculoskeletal: Normal  tone, without deformities  Psychiatric: Cooperative and appropriate      LABS:                  8.9    11.3  )-----------( 272      ( 23 Dec 2017 07:32 )             28.1     22 Dec 2017 11:02    140    |  107    |  27     ----------------------------<  87     3.7     |  26     |  0.97     Ca    8.5        22 Dec 2017 11:02    TPro  7.9    /  Alb  3.5    /  TBili  0.4    /  DBili  x      /  AST  22     /  ALT  23     /  AlkPhos  89     22 Dec 2017 11:0  PT/INR - ( 22 Dec 2017 11:02 )   PT: 11.7 sec;   INR: 1.08 ratio    PTT - ( 22 Dec 2017 11:02 )  PTT:28.2 sec  LIVER FUNCTIONS - ( 22 Dec 2017 11:02 )  Alb: 3.5 g/dL / Pro: 7.9 gm/dL / ALK PHOS: 89 U/L / ALT: 23 U/L / AST: 22 U/L / GGT: x               MEDICATIONS  (STANDING):  ALBUTerol/ipratropium for Nebulization 3 milliLiter(s) Nebulizer every 6 hours  aspirin  chewable 81 milliGRAM(s) Oral daily  azithromycin  IVPB 500 milliGRAM(s) IV Intermittent every 24 hours  cefTRIAXone Injectable 1000 milliGRAM(s) IV Push every 24 hours  dorzolamide 2%/timolol 0.5% Ophthalmic Solution 1 Drop(s) Both EYES two times a day  heparin  Injectable 5000 Unit(s) SubCutaneous every 8 hours  latanoprost 0.005% Ophthalmic Solution 1 Drop(s) Both EYES at bedtime  methylPREDNISolone sodium succinate Injectable 40 milliGRAM(s) IV Push two times a day  pilocarpine 4% Solution 1 Drop(s) Both EYES two times a day  simvastatin 20 milliGRAM(s) Oral at bedtime  sodium chloride 0.9%. 1000 milliLiter(s) (50 mL/Hr) IV Continuous <Continuous>    MEDICATIONS  (PRN):  acetaminophen   Tablet 650 milliGRAM(s) Oral every 6 hours PRN For Temp greater than 38 C (100.4 F)      RADIOLOGY & ADDITIONAL TESTS:    < from: Xray Chest 1 View AP/PA. (12.22.17 @ 12:29) >  EXAM:  CHEST SINGLE VIEW FRONTAL                            PROCEDURE DATE:  12/22/2017          INTERPRETATION:  Portable chest radiograph dated 12/22/17.    COMPARISON: 7/27/2014.    CLINICAL INFORMATION: SOB.    FINDINGS:    The airway is midline.  Interval development of partial atelectasis//infiltration involving the   left lower lobe. The remainder of the lungs are clear.  There is no pleural effusion or pneumothorax.   Cardiomegaly atherosclerotic aorta are again noted.  The bones are normal.   Surgical clips in the left axilla. A hiatal hernia.      IMPRESSION:  Interval development of infiltrate and/or partial atelectasis in the left   lower lobe. Left lower lobe pneumonia is the consideration and clinical   correlation is recommended. CC: Cough (22 Dec 2017 16:39)    HPI:  89 yo F with pmh HTN p/w dyspnea x 1 day. Was recently seen by pmd for URI and was started on Z-pack for the same along with prednisone. No fevers, chills, nausea or vomiting.   CXR c/w LLL infiltrate and started on empiric abx in ED.     INTERVAL HPI/ OVERNIGHT EVENTS: Pt was seen and examined,  overall better, still with cough, no fevers.     Vital Signs Last 24 Hrs  T(C): 36.6 (23 Dec 2017 11:55), Max: 36.7 (22 Dec 2017 13:48)  T(F): 97.8 (23 Dec 2017 11:55), Max: 98 (22 Dec 2017 13:48)  HR: 78 (23 Dec 2017 11:55) (74 - 98)  BP: 160/77 (23 Dec 2017 11:55) (112/59 - 160/77)  RR: 17 (23 Dec 2017 11:55) (17 - 20)  SpO2: 97% (23 Dec 2017 11:55) (94% - 100%)      REVIEW OF SYSTEMS:  All other review of systems is negative unless indicated above.      PHYSICAL EXAM:  General: Well developed; malnourished elderly female frail looking,  in no acute distress  Eyes: PERRLA, EOMI; conjunctiva and sclera clear  Head: Normocephalic; atraumatic  ENMT: No nasal discharge; airway clear  Neck: Supple; no  LAD  Respiratory: decreased BS at LLL,  coarse sounds,  scattered wheezes,  No rales or rhonchi  Cardiovascular: Regular rate and rhythm. S1 and S2 Normal; No murmurs  Gastrointestinal: Soft non-tender non-distended; Normal bowel sounds  Genitourinary: No costovertebral angle tenderness, no suprapubic tenderness   Extremities: Preserved range of motion, No  edema  Vascular: Peripheral pulses palpable 2+ bilaterally  Neurological: Alert and oriented x3  Skin: Warm and dry. No acute rash, Chronic skin changes   Musculoskeletal: Normal  tone, without deformities  Psychiatric: Cooperative and appropriate      LABS:                             8.2    14.0  )-----------( 258      ( 24 Dec 2017 08:26 )             27.1     12-24    142  |  113<H>  |  31<H>  ----------------------------<  103<H>  4.2   |  19<L>  |  0.89    Ca    7.8<L>      24 Dec 2017 08:26    Urinalysis Basic - ( 23 Dec 2017 21:45 )    Color: Yellow / Appearance: Clear / S.015 / pH: x  Gluc: x / Ketone: Negative  / Bili: Negative / Urobili: Negative mg/dL   Blood: x / Protein: Negative mg/dL / Nitrite: Positive   Leuk Esterase: Small / RBC: 3-5 /HPF / WBC >50   Sq Epi: x / Non Sq Epi: Moderate / Bacteria: Moderate                 8.9    11.3  )-----------( 272      ( 23 Dec 2017 07:32 )             28.1     22 Dec 2017 11:02    140    |  107    |  27     ----------------------------<  87     3.7     |  26     |  0.97     Ca    8.5        22 Dec 2017 11:02    TPro  7.9    /  Alb  3.5    /  TBili  0.4    /  DBili  x      /  AST  22     /  ALT  23     /  AlkPhos  89     22 Dec 2017 11:0  PT/INR - ( 22 Dec 2017 11:02 )   PT: 11.7 sec;   INR: 1.08 ratio    PTT - ( 22 Dec 2017 11:02 )  PTT:28.2 sec  LIVER FUNCTIONS - ( 22 Dec 2017 11:02 )  Alb: 3.5 g/dL / Pro: 7.9 gm/dL / ALK PHOS: 89 U/L / ALT: 23 U/L / AST: 22 U/L / GGT: x           Culture - Blood (17 @ 12:22)    Specimen Source: .Blood None    Culture Results:   No growth to date.    Culture - Blood (17 @ 11:02)    Specimen Source: .Blood Blood-Peripheral    Culture Results:   No growth to date.      MEDICATIONS  (STANDING):  ALBUTerol/ipratropium for Nebulization 3 milliLiter(s) Nebulizer every 6 hours  aspirin  chewable 81 milliGRAM(s) Oral daily  azithromycin  IVPB 500 milliGRAM(s) IV Intermittent every 24 hours  cefTRIAXone Injectable 1000 milliGRAM(s) IV Push every 24 hours  dorzolamide 2%/timolol 0.5% Ophthalmic Solution 1 Drop(s) Both EYES two times a day  guaiFENesin  milliGRAM(s) Oral every 12 hours  heparin  Injectable 5000 Unit(s) SubCutaneous every 8 hours  latanoprost 0.005% Ophthalmic Solution 1 Drop(s) Both EYES at bedtime  methylPREDNISolone sodium succinate Injectable 40 milliGRAM(s) IV Push two times a day  pilocarpine 4% Solution 1 Drop(s) Both EYES two times a day  simvastatin 20 milliGRAM(s) Oral at bedtime  sodium chloride 0.9%. 1000 milliLiter(s) (50 mL/Hr) IV Continuous <Continuous>    MEDICATIONS  (PRN):  acetaminophen   Tablet 650 milliGRAM(s) Oral every 6 hours PRN For Temp greater than 38 C (100.4 F)  docusate sodium 100 milliGRAM(s) Oral two times a day PRN Constipation  polyethylene glycol 3350 17 Gram(s) Oral daily PRN Constipation        RADIOLOGY & ADDITIONAL TESTS:    EXAM:  CHEST SINGLE VIEW FRONTAL                        PROCEDURE DATE:  2017      FINDINGS:    The airway is midline.  Interval development of partial atelectasis//infiltration involving the   left lower lobe. The remainder of the lungs are clear.  There is no pleural effusion or pneumothorax.   Cardiomegaly atherosclerotic aorta are again noted.  The bones are normal.   Surgical clips in the left axilla. A hiatal hernia.      IMPRESSION:  Interval development of infiltrate and/or partial atelectasis in the left   lower lobe. Left lower lobe pneumonia is the consideration and clinical   correlation is recommended.

## 2017-12-25 LAB
ANION GAP SERPL CALC-SCNC: 11 MMOL/L — SIGNIFICANT CHANGE UP (ref 5–17)
BUN SERPL-MCNC: 27 MG/DL — HIGH (ref 7–23)
CALCIUM SERPL-MCNC: 8.1 MG/DL — LOW (ref 8.5–10.1)
CHLORIDE SERPL-SCNC: 112 MMOL/L — HIGH (ref 96–108)
CO2 SERPL-SCNC: 19 MMOL/L — LOW (ref 22–31)
CREAT SERPL-MCNC: 0.94 MG/DL — SIGNIFICANT CHANGE UP (ref 0.5–1.3)
CULTURE RESULTS: SIGNIFICANT CHANGE UP
GLUCOSE SERPL-MCNC: 127 MG/DL — HIGH (ref 70–99)
HCT VFR BLD CALC: 29.4 % — LOW (ref 34.5–45)
HGB BLD-MCNC: 9.1 G/DL — LOW (ref 11.5–15.5)
MCHC RBC-ENTMCNC: 24.4 PG — LOW (ref 27–34)
MCHC RBC-ENTMCNC: 30.9 GM/DL — LOW (ref 32–36)
MCV RBC AUTO: 78.9 FL — LOW (ref 80–100)
PLATELET # BLD AUTO: 321 K/UL — SIGNIFICANT CHANGE UP (ref 150–400)
POTASSIUM SERPL-MCNC: 3.6 MMOL/L — SIGNIFICANT CHANGE UP (ref 3.5–5.3)
POTASSIUM SERPL-SCNC: 3.6 MMOL/L — SIGNIFICANT CHANGE UP (ref 3.5–5.3)
RBC # BLD: 3.72 M/UL — LOW (ref 3.8–5.2)
RBC # FLD: 14.9 % — HIGH (ref 10.3–14.5)
SODIUM SERPL-SCNC: 142 MMOL/L — SIGNIFICANT CHANGE UP (ref 135–145)
SPECIMEN SOURCE: SIGNIFICANT CHANGE UP
WBC # BLD: 19.3 K/UL — HIGH (ref 3.8–10.5)
WBC # FLD AUTO: 19.3 K/UL — HIGH (ref 3.8–10.5)

## 2017-12-25 RX ORDER — AMLODIPINE BESYLATE 2.5 MG/1
5 TABLET ORAL DAILY
Qty: 0 | Refills: 0 | Status: DISCONTINUED | OUTPATIENT
Start: 2017-12-25 | End: 2017-12-27

## 2017-12-25 RX ORDER — LANOLIN ALCOHOL/MO/W.PET/CERES
3 CREAM (GRAM) TOPICAL AT BEDTIME
Qty: 0 | Refills: 0 | Status: DISCONTINUED | OUTPATIENT
Start: 2017-12-25 | End: 2017-12-27

## 2017-12-25 RX ORDER — LIDOCAINE 4 G/100G
1 CREAM TOPICAL DAILY
Qty: 0 | Refills: 0 | Status: DISCONTINUED | OUTPATIENT
Start: 2017-12-25 | End: 2017-12-27

## 2017-12-25 RX ADMIN — CEFTRIAXONE 1000 MILLIGRAM(S): 500 INJECTION, POWDER, FOR SOLUTION INTRAMUSCULAR; INTRAVENOUS at 17:23

## 2017-12-25 RX ADMIN — PANTOPRAZOLE SODIUM 40 MILLIGRAM(S): 20 TABLET, DELAYED RELEASE ORAL at 06:26

## 2017-12-25 RX ADMIN — Medication 600 MILLIGRAM(S): at 06:26

## 2017-12-25 RX ADMIN — HEPARIN SODIUM 5000 UNIT(S): 5000 INJECTION INTRAVENOUS; SUBCUTANEOUS at 14:29

## 2017-12-25 RX ADMIN — Medication 600 MILLIGRAM(S): at 17:32

## 2017-12-25 RX ADMIN — AMLODIPINE BESYLATE 5 MILLIGRAM(S): 2.5 TABLET ORAL at 14:28

## 2017-12-25 RX ADMIN — Medication 325 MILLIGRAM(S): at 17:32

## 2017-12-25 RX ADMIN — SODIUM CHLORIDE 50 MILLILITER(S): 9 INJECTION INTRAMUSCULAR; INTRAVENOUS; SUBCUTANEOUS at 06:25

## 2017-12-25 RX ADMIN — DORZOLAMIDE HYDROCHLORIDE TIMOLOL MALEATE 1 DROP(S): 20; 5 SOLUTION/ DROPS OPHTHALMIC at 17:30

## 2017-12-25 RX ADMIN — Medication 81 MILLIGRAM(S): at 14:28

## 2017-12-25 RX ADMIN — HEPARIN SODIUM 5000 UNIT(S): 5000 INJECTION INTRAVENOUS; SUBCUTANEOUS at 23:31

## 2017-12-25 RX ADMIN — PREGABALIN 1000 MICROGRAM(S): 225 CAPSULE ORAL at 14:28

## 2017-12-25 RX ADMIN — DORZOLAMIDE HYDROCHLORIDE TIMOLOL MALEATE 1 DROP(S): 20; 5 SOLUTION/ DROPS OPHTHALMIC at 06:25

## 2017-12-25 RX ADMIN — HEPARIN SODIUM 5000 UNIT(S): 5000 INJECTION INTRAVENOUS; SUBCUTANEOUS at 06:26

## 2017-12-25 RX ADMIN — LATANOPROST 1 DROP(S): 0.05 SOLUTION/ DROPS OPHTHALMIC; TOPICAL at 23:32

## 2017-12-25 RX ADMIN — SIMVASTATIN 20 MILLIGRAM(S): 20 TABLET, FILM COATED ORAL at 23:31

## 2017-12-25 RX ADMIN — Medication 40 MILLIGRAM(S): at 06:26

## 2017-12-25 RX ADMIN — Medication 1 DROP(S): at 10:09

## 2017-12-25 RX ADMIN — AZITHROMYCIN 255 MILLIGRAM(S): 500 TABLET, FILM COATED ORAL at 17:24

## 2017-12-25 RX ADMIN — Medication 3 MILLILITER(S): at 13:34

## 2017-12-25 RX ADMIN — Medication 1 DROP(S): at 17:30

## 2017-12-25 NOTE — PROGRESS NOTE ADULT - ASSESSMENT
89 yo F with pmh HTN, HLDp/w dyspnea x 1 day.     A/P:   1. Dyspnea 2/2 to CAP/ LLL PNA  with superimposed RSV Bronchitis with Bronchospasm  No signs of sepsis, not febrile  Leukocytosis  now likely due to steroids   Tolerates RA at rest, monitor pulse ox  F/u BCX: NGTD  Sputum Cx  Musinex for cough   C/w Azithromycin and Rocephin  C/w Solumedrol IV  Neb Tx      2. HTN  not on meds  BP elevated, will start low dose amlodipine     3. HLD  c/w statin    4. Microcytic Anemia  unclear etiology  anemia work up sent   HAs B12 low, Iron studies with mixed picture   Monitor H/H  closely  Occult blood pending       5. Dehydration  Monitor Oral intake and gentle IVF       6. Hx of colon CA and Breast CA  stable     7.  Leukocytosis  Viral vs UTI? vs 2/2 steroids  Monitor for fevers  C/w ABxs  Will start taper down steroids     8. DVT PPXs 89 yo F with pmh HTN, HLDp/w dyspnea x 1 day.     A/P:   1. Dyspnea 2/2 to CAP/ LLL PNA  with superimposed RSV Bronchitis with Bronchospasm  No signs of sepsis, not febrile  Leukocytosis  now likely due to steroids, monitor for fevers and signs of infection, titrate down steroids   Tolerates RA at rest, monitor pulse ox  F/u BCX: NGTD  Sputum Cx  Musinex for cough, add tessalon pearls PRN  C/w Azithromycin and Rocephin  On  Solumedrol IV, switch to PO prednisone taper   Neb Tx      2. HTN  not on meds, reports no H/o elevated  BP   Start low dose amlodipine     3. HLD  c/w statin    4. Microcytic Anemia  unclear etiology  anemia work up sent   HAs B12 low,, start supplementation  Iron studies with mixed picture   Monitor H/H  closely  Occult blood pending       5. Dehydration  Monitor Oral intake   D/w  IVF       6. Hx of colon CA and Breast CA  stable     7.  Leukocytosis  Viral vs UTI? vs 2/2 steroids  Monitor for fevers  C/w ABxs  UCX>3 organism   start taper down steroids   If persists consider Ct chest     8. DVT PPXs     Dispo: PT [place], SW for d/c planning

## 2017-12-25 NOTE — PROGRESS NOTE ADULT - SUBJECTIVE AND OBJECTIVE BOX
CC: Cough (22 Dec 2017 16:39)    HPI:  89 yo F with pmh HTN p/w dyspnea x 1 day. Was recently seen by pmd for URI and was started on Z-pack for the same along with prednisone. No fevers, chills, nausea or vomiting.   CXR c/w LLL infiltrate and started on empiric abx in ED.     INTERVAL HPI/ OVERNIGHT EVENTS: Pt was seen and examined,  overall better, still with cough, no fevers.     Vital Signs Last 24 Hrs  T(C): 36.6 (23 Dec 2017 11:55), Max: 36.7 (22 Dec 2017 13:48)  T(F): 97.8 (23 Dec 2017 11:55), Max: 98 (22 Dec 2017 13:48)  HR: 78 (23 Dec 2017 11:55) (74 - 98)  BP: 160/77 (23 Dec 2017 11:55) (112/59 - 160/77)  RR: 17 (23 Dec 2017 11:55) (17 - 20)  SpO2: 97% (23 Dec 2017 11:55) (94% - 100%)      REVIEW OF SYSTEMS:  All other review of systems is negative unless indicated above.      PHYSICAL EXAM:  General: Well developed; malnourished elderly female frail looking,  in no acute distress  Eyes: PERRLA, EOMI; conjunctiva and sclera clear  Head: Normocephalic; atraumatic  ENMT: No nasal discharge; airway clear  Neck: Supple; no  LAD  Respiratory: decreased BS at LLL,  coarse sounds,  scattered wheezes,  No rales or rhonchi  Cardiovascular: Regular rate and rhythm. S1 and S2 Normal; No murmurs  Gastrointestinal: Soft non-tender non-distended; Normal bowel sounds  Genitourinary: No costovertebral angle tenderness, no suprapubic tenderness   Extremities: Preserved range of motion, No  edema  Vascular: Peripheral pulses palpable 2+ bilaterally  Neurological: Alert and oriented x3  Skin: Warm and dry. No acute rash, Chronic skin changes   Musculoskeletal: Normal  tone, without deformities  Psychiatric: Cooperative and appropriate      LABS:                             8.2    14.0  )-----------( 258      ( 24 Dec 2017 08:26 )             27.1     12-24    142  |  113<H>  |  31<H>  ----------------------------<  103<H>  4.2   |  19<L>  |  0.89    Ca    7.8<L>      24 Dec 2017 08:26    Urinalysis Basic - ( 23 Dec 2017 21:45 )    Color: Yellow / Appearance: Clear / S.015 / pH: x  Gluc: x / Ketone: Negative  / Bili: Negative / Urobili: Negative mg/dL   Blood: x / Protein: Negative mg/dL / Nitrite: Positive   Leuk Esterase: Small / RBC: 3-5 /HPF / WBC >50   Sq Epi: x / Non Sq Epi: Moderate / Bacteria: Moderate                 8.9    11.3  )-----------( 272      ( 23 Dec 2017 07:32 )             28.1     22 Dec 2017 11:02    140    |  107    |  27     ----------------------------<  87     3.7     |  26     |  0.97     Ca    8.5        22 Dec 2017 11:02    TPro  7.9    /  Alb  3.5    /  TBili  0.4    /  DBili  x      /  AST  22     /  ALT  23     /  AlkPhos  89     22 Dec 2017 11:0  PT/INR - ( 22 Dec 2017 11:02 )   PT: 11.7 sec;   INR: 1.08 ratio    PTT - ( 22 Dec 2017 11:02 )  PTT:28.2 sec  LIVER FUNCTIONS - ( 22 Dec 2017 11:02 )  Alb: 3.5 g/dL / Pro: 7.9 gm/dL / ALK PHOS: 89 U/L / ALT: 23 U/L / AST: 22 U/L / GGT: x           Culture - Blood (17 @ 12:22)    Specimen Source: .Blood None    Culture Results:   No growth to date.    Culture - Blood (17 @ 11:02)    Specimen Source: .Blood Blood-Peripheral    Culture Results:   No growth to date.      MEDICATIONS  (STANDING):  ALBUTerol/ipratropium for Nebulization 3 milliLiter(s) Nebulizer every 6 hours  aspirin  chewable 81 milliGRAM(s) Oral daily  azithromycin  IVPB 500 milliGRAM(s) IV Intermittent every 24 hours  cefTRIAXone Injectable 1000 milliGRAM(s) IV Push every 24 hours  dorzolamide 2%/timolol 0.5% Ophthalmic Solution 1 Drop(s) Both EYES two times a day  guaiFENesin  milliGRAM(s) Oral every 12 hours  heparin  Injectable 5000 Unit(s) SubCutaneous every 8 hours  latanoprost 0.005% Ophthalmic Solution 1 Drop(s) Both EYES at bedtime  methylPREDNISolone sodium succinate Injectable 40 milliGRAM(s) IV Push two times a day  pilocarpine 4% Solution 1 Drop(s) Both EYES two times a day  simvastatin 20 milliGRAM(s) Oral at bedtime  sodium chloride 0.9%. 1000 milliLiter(s) (50 mL/Hr) IV Continuous <Continuous>    MEDICATIONS  (PRN):  acetaminophen   Tablet 650 milliGRAM(s) Oral every 6 hours PRN For Temp greater than 38 C (100.4 F)  docusate sodium 100 milliGRAM(s) Oral two times a day PRN Constipation  polyethylene glycol 3350 17 Gram(s) Oral daily PRN Constipation        RADIOLOGY & ADDITIONAL TESTS:    EXAM:  CHEST SINGLE VIEW FRONTAL                        PROCEDURE DATE:  2017      FINDINGS:    The airway is midline.  Interval development of partial atelectasis//infiltration involving the   left lower lobe. The remainder of the lungs are clear.  There is no pleural effusion or pneumothorax.   Cardiomegaly atherosclerotic aorta are again noted.  The bones are normal.   Surgical clips in the left axilla. A hiatal hernia.      IMPRESSION:  Interval development of infiltrate and/or partial atelectasis in the left   lower lobe. Left lower lobe pneumonia is the consideration and clinical   correlation is recommended. CC: Cough (22 Dec 2017 16:39)    HPI:  89 yo F with pmh HTN p/w dyspnea x 1 day. Was recently seen by pmd for URI and was started on Z-pack for the same along with prednisone. No fevers, chills, nausea or vomiting.   CXR c/w LLL infiltrate and started on empiric abx in ED.     INTERVAL HPI/ OVERNIGHT EVENTS: Pt was seen and examined,   forgetful,  c/o not being able to sleep last night due to cough and also c/o back  pain, states that usually sleeps in reclining chair and uncomfortable here. Overall  better, no SOB, no  fevers.  POC discussed. Pt was counseled regarding Aleve, will need further education at discharge     Vital Signs Last 24 Hrs  T(C): 36.9 (25 Dec 2017 11:31), Max: 36.9 (25 Dec 2017 11:31)  T(F): 98.4 (25 Dec 2017 11:31), Max: 98.4 (25 Dec 2017 11:31)  HR: 82 (25 Dec 2017 11:31) (65 - 82)  BP: 161/78 (25 Dec 2017 11:31) (147/49 - 169/75)  RR: 18 (25 Dec 2017 11:31) (17 - 18)  SpO2: 94% (25 Dec 2017 11:31) (94% - 97%)      REVIEW OF SYSTEMS:  All other review of systems is negative unless indicated above.      PHYSICAL EXAM:  General: Well developed; malnourished elderly female frail looking,  in no acute distress  Eyes: PERRLA, EOMI; conjunctiva and sclera clear  Head: Normocephalic; atraumatic  ENMT: No nasal discharge; airway clear  Neck: Supple; no  LAD  Respiratory: decreased BS at LLL,  coarse sounds, no  wheezes,  No rales or rhonchi  Cardiovascular: Regular rate and rhythm. S1 and S2 Normal; No murmurs  Gastrointestinal: Soft non-tender non-distended; Normal bowel sounds  Genitourinary: No costovertebral angle tenderness, no suprapubic tenderness   Extremities: Preserved range of motion, No  edema  Vascular: Peripheral pulses palpable 2+ bilaterally  Neurological: Alert and oriented x3  Skin: Warm and dry. No acute rash, Chronic skin changes   Musculoskeletal: Normal  tone, without deformities  Psychiatric: Cooperative and appropriate      LABS:                              9.1    19.3  )-----------( 321      ( 25 Dec 2017 11:16 )             29.4     12-    142  |  112<H>  |  27<H>  ----------------------------<  127<H>  3.6   |  19<L>  |  0.94    Ca    8.1<L>      25 Dec 2017 11:16  Urinalysis Basic - ( 23 Dec 2017 21:45 )    Color: Yellow / Appearance: Clear / S.015 / pH: x  Gluc: x / Ketone: Negative  / Bili: Negative / Urobili: Negative mg/dL   Blood: x / Protein: Negative mg/dL / Nitrite: Positive   Leuk Esterase: Small / RBC: 3-5 /HPF / WBC >50   Sq Epi: x / Non Sq Epi: Moderate / Bacteria: Moderate                           8.2    14.0  )-----------( 258      ( 24 Dec 2017 08:26 )             27.1     12-    142  |  113<H>  |  31<H>  ----------------------------<  103<H>  4.2   |  19<L>  |  0.89    Ca    7.8<L>      24 Dec 2017 08:26    Urinalysis Basic - ( 23 Dec 2017 21:45 )    Color: Yellow / Appearance: Clear / S.015 / pH: x  Gluc: x / Ketone: Negative  / Bili: Negative / Urobili: Negative mg/dL   Blood: x / Protein: Negative mg/dL / Nitrite: Positive   Leuk Esterase: Small / RBC: 3-5 /HPF / WBC >50   Sq Epi: x / Non Sq Epi: Moderate / Bacteria: Moderate                 8.9    11.3  )-----------( 272      ( 23 Dec 2017 07:32 )             28.1     22 Dec 2017 11:02    140    |  107    |  27     ----------------------------<  87     3.7     |  26     |  0.97     Ca    8.5        22 Dec 2017 11:02    TPro  7.9    /  Alb  3.5    /  TBili  0.4    /  DBili  x      /  AST  22     /  ALT  23     /  AlkPhos  89     22 Dec 2017 11:0  PT/INR - ( 22 Dec 2017 11:02 )   PT: 11.7 sec;   INR: 1.08 ratio    PTT - ( 22 Dec 2017 11:02 )  PTT:28.2 sec  LIVER FUNCTIONS - ( 22 Dec 2017 11:02 )  Alb: 3.5 g/dL / Pro: 7.9 gm/dL / ALK PHOS: 89 U/L / ALT: 23 U/L / AST: 22 U/L / GGT: x           Culture - Blood (17 @ 12:22)    Specimen Source: .Blood None    Culture Results:   No growth to date.    Culture - Blood (17 @ 11:02)    Specimen Source: .Blood Blood-Peripheral    Culture Results:   No growth to date.    MEDICATIONS  (STANDING):  ALBUTerol/ipratropium for Nebulization 3 milliLiter(s) Nebulizer every 6 hours  amLODIPine   Tablet 5 milliGRAM(s) Oral daily  aspirin  chewable 81 milliGRAM(s) Oral daily  azithromycin  IVPB 500 milliGRAM(s) IV Intermittent every 24 hours  cefTRIAXone Injectable 1000 milliGRAM(s) IV Push every 24 hours  cyanocobalamin Injectable 1000 MICROGram(s) SubCutaneous daily  dorzolamide 2%/timolol 0.5% Ophthalmic Solution 1 Drop(s) Both EYES two times a day  ferrous    sulfate 325 milliGRAM(s) Oral two times a day with meals  guaiFENesin  milliGRAM(s) Oral every 12 hours  heparin  Injectable 5000 Unit(s) SubCutaneous every 8 hours  latanoprost 0.005% Ophthalmic Solution 1 Drop(s) Both EYES at bedtime  pantoprazole    Tablet 40 milliGRAM(s) Oral before breakfast  pilocarpine 4% Solution 1 Drop(s) Both EYES two times a day  simvastatin 20 milliGRAM(s) Oral at bedtime    MEDICATIONS  (PRN):  acetaminophen   Tablet 650 milliGRAM(s) Oral every 6 hours PRN For Temp greater than 38 C (100.4 F)  docusate sodium 100 milliGRAM(s) Oral two times a day PRN Constipation  polyethylene glycol 3350 17 Gram(s) Oral daily PRN Constipation          RADIOLOGY & ADDITIONAL TESTS:    EXAM:  CHEST SINGLE VIEW FRONTAL                        PROCEDURE DATE:  2017      FINDINGS:    The airway is midline.  Interval development of partial atelectasis//infiltration involving the   left lower lobe. The remainder of the lungs are clear.  There is no pleural effusion or pneumothorax.   Cardiomegaly atherosclerotic aorta are again noted.  The bones are normal.   Surgical clips in the left axilla. A hiatal hernia.      IMPRESSION:  Interval development of infiltrate and/or partial atelectasis in the left   lower lobe. Left lower lobe pneumonia is the consideration and clinical   correlation is recommended.

## 2017-12-26 LAB
ANION GAP SERPL CALC-SCNC: 8 MMOL/L — SIGNIFICANT CHANGE UP (ref 5–17)
BUN SERPL-MCNC: 21 MG/DL — SIGNIFICANT CHANGE UP (ref 7–23)
CALCIUM SERPL-MCNC: 7.6 MG/DL — LOW (ref 8.5–10.1)
CHLORIDE SERPL-SCNC: 111 MMOL/L — HIGH (ref 96–108)
CO2 SERPL-SCNC: 23 MMOL/L — SIGNIFICANT CHANGE UP (ref 22–31)
CREAT SERPL-MCNC: 0.86 MG/DL — SIGNIFICANT CHANGE UP (ref 0.5–1.3)
GLUCOSE SERPL-MCNC: 84 MG/DL — SIGNIFICANT CHANGE UP (ref 70–99)
HCT VFR BLD CALC: 29.3 % — LOW (ref 34.5–45)
HGB BLD-MCNC: 9.3 G/DL — LOW (ref 11.5–15.5)
MCHC RBC-ENTMCNC: 25.2 PG — LOW (ref 27–34)
MCHC RBC-ENTMCNC: 31.6 GM/DL — LOW (ref 32–36)
MCV RBC AUTO: 79.6 FL — LOW (ref 80–100)
PLATELET # BLD AUTO: 300 K/UL — SIGNIFICANT CHANGE UP (ref 150–400)
POTASSIUM SERPL-MCNC: 3.5 MMOL/L — SIGNIFICANT CHANGE UP (ref 3.5–5.3)
POTASSIUM SERPL-SCNC: 3.5 MMOL/L — SIGNIFICANT CHANGE UP (ref 3.5–5.3)
RBC # BLD: 3.68 M/UL — LOW (ref 3.8–5.2)
RBC # FLD: 15.1 % — HIGH (ref 10.3–14.5)
SODIUM SERPL-SCNC: 142 MMOL/L — SIGNIFICANT CHANGE UP (ref 135–145)
TRANSFERRIN SERPL-MCNC: 228 MG/DL — SIGNIFICANT CHANGE UP (ref 200–360)
WBC # BLD: 14.9 K/UL — HIGH (ref 3.8–10.5)
WBC # FLD AUTO: 14.9 K/UL — HIGH (ref 3.8–10.5)

## 2017-12-26 RX ADMIN — PREGABALIN 1000 MICROGRAM(S): 225 CAPSULE ORAL at 15:07

## 2017-12-26 RX ADMIN — HEPARIN SODIUM 5000 UNIT(S): 5000 INJECTION INTRAVENOUS; SUBCUTANEOUS at 06:06

## 2017-12-26 RX ADMIN — Medication 1 DROP(S): at 06:08

## 2017-12-26 RX ADMIN — Medication 600 MILLIGRAM(S): at 17:46

## 2017-12-26 RX ADMIN — DORZOLAMIDE HYDROCHLORIDE TIMOLOL MALEATE 1 DROP(S): 20; 5 SOLUTION/ DROPS OPHTHALMIC at 06:07

## 2017-12-26 RX ADMIN — DORZOLAMIDE HYDROCHLORIDE TIMOLOL MALEATE 1 DROP(S): 20; 5 SOLUTION/ DROPS OPHTHALMIC at 17:44

## 2017-12-26 RX ADMIN — Medication 325 MILLIGRAM(S): at 17:01

## 2017-12-26 RX ADMIN — Medication 325 MILLIGRAM(S): at 09:18

## 2017-12-26 RX ADMIN — AMLODIPINE BESYLATE 5 MILLIGRAM(S): 2.5 TABLET ORAL at 06:06

## 2017-12-26 RX ADMIN — Medication 40 MILLIGRAM(S): at 06:06

## 2017-12-26 RX ADMIN — HEPARIN SODIUM 5000 UNIT(S): 5000 INJECTION INTRAVENOUS; SUBCUTANEOUS at 15:07

## 2017-12-26 RX ADMIN — PANTOPRAZOLE SODIUM 40 MILLIGRAM(S): 20 TABLET, DELAYED RELEASE ORAL at 06:06

## 2017-12-26 RX ADMIN — Medication 600 MILLIGRAM(S): at 06:06

## 2017-12-26 RX ADMIN — HEPARIN SODIUM 5000 UNIT(S): 5000 INJECTION INTRAVENOUS; SUBCUTANEOUS at 22:14

## 2017-12-26 RX ADMIN — LATANOPROST 1 DROP(S): 0.05 SOLUTION/ DROPS OPHTHALMIC; TOPICAL at 22:14

## 2017-12-26 RX ADMIN — CEFTRIAXONE 1000 MILLIGRAM(S): 500 INJECTION, POWDER, FOR SOLUTION INTRAMUSCULAR; INTRAVENOUS at 17:01

## 2017-12-26 RX ADMIN — SIMVASTATIN 20 MILLIGRAM(S): 20 TABLET, FILM COATED ORAL at 22:14

## 2017-12-26 RX ADMIN — Medication 3 MILLILITER(S): at 13:47

## 2017-12-26 RX ADMIN — Medication 81 MILLIGRAM(S): at 12:26

## 2017-12-26 RX ADMIN — Medication 1 DROP(S): at 17:42

## 2017-12-26 RX ADMIN — AZITHROMYCIN 255 MILLIGRAM(S): 500 TABLET, FILM COATED ORAL at 17:00

## 2017-12-26 NOTE — PHYSICAL THERAPY INITIAL EVALUATION ADULT - MODALITIES TREATMENT COMMENTS
The pt responded well to transfer tx, ambulation tx and therex review during PT evaluation. The pt was left sitting OOB and in the chair at end of tx, cb, tray and phone in place. Isolation precautions maintained t/o tx. The pt was in NAD at end of tx, being tended to by the nursing assistant

## 2017-12-26 NOTE — PHYSICAL THERAPY INITIAL EVALUATION ADULT - GENERAL OBSERVATIONS, REHAB EVAL
The pt was pleasant and cooperative, received on 5E, supine and eager to get OOB and ambulate. Contact/ Droplet Isolation Precautions maintained t/o tx. The pt was also very [place].

## 2017-12-26 NOTE — PHYSICAL THERAPY INITIAL EVALUATION ADULT - DIAGNOSIS, PT EVAL
89 y.o. female with  Dyspnea 2/2 to CAP/ LLL PNA  with superimposed RSV Bronchitis with Bronchospasm

## 2017-12-26 NOTE — PHYSICAL THERAPY INITIAL EVALUATION ADULT - ADDITIONAL COMMENTS
The pt reports that she was normally independent while at home prior to admission but used a rollator walker PRN. The pt reports that she had a private aide that came for 6 hours a day for 3 days a week prior to admission.

## 2017-12-26 NOTE — PROGRESS NOTE ADULT - SUBJECTIVE AND OBJECTIVE BOX
CC: Cough  HPI:  89 yo F with pmh HTN, HLD, breast ca s/p treatment, colon ca s/p treatment, who  p/w dyspnea x 1 day. Was recently seen by pmd for URI and was started on Z-pack for the same along with prednisone. No fevers, chills, nausea or vomiting.   CXR c/w LLL infiltrate and pt was admitted with pna. She was also found to have superimposed RSV.    12/26: pt seen and exmained. chart/labs reviewed. Pt overal feeling better. Forgetful though. No pain. Cough improving. No sob at rest. States she didn't ambulate today.   but then stated she doesn't remember.     Vital Signs Last 24 Hrs  T(C): 36.7 (26 Dec 2017 11:35), Max: 36.7 (26 Dec 2017 11:35)  T(F): 98.1 (26 Dec 2017 11:35), Max: 98.1 (26 Dec 2017 11:35)  HR: 74 (26 Dec 2017 13:48) (68 - 82)  BP: 123/57 (26 Dec 2017 11:35) (123/57 - 173/72)  RR: 18 (26 Dec 2017 11:35) (17 - 19)  SpO2: 97% (26 Dec 2017 13:48) (93% - 98%)  REVIEW OF SYSTEMS:  All other review of systems is negative unless indicated above.      PHYSICAL EXAM:  General: Malnourished elderly female frail looking,  in no acute distress  Eyes: PERRLA, EOMI; conjunctiva and sclera clear  Head: Normocephalic; atraumatic  ENMT: No nasal discharge; airway clear  Neck: Supple; no  LAD  Respiratory: decreased BS at LLL,  coarse sounds, no  wheezes,    Cardiovascular: Regular rate and rhythm. S1 and S2 Normal;  Gastrointestinal: Soft non-tender non-distended; Normal bowel sounds  Genitourinary: No costovertebral angle tenderness, no suprapubic tenderness   Extremities: Preserved range of motion, No  edema  Vascular: Peripheral pulses palpable 2+ bilaterally  Neurological: Alert and oriented x3 but forgetful. Non focal  Skin: Warm and dry. No acute rash, Chronic skin changes   Musculoskeletal: Normal  tone, without deformities  LABS:                        9.3    14.9  )-----------( 300      ( 26 Dec 2017 07:10 )             29.3     12-26    142  |  111<H>  |  21  ----------------------------<  84  3.5   |  23  |  0.86    Ca    7.6<L>      26 Dec 2017 07:10  MEDICATIONS  (STANDING):  ALBUTerol/ipratropium for Nebulization 3 milliLiter(s) Nebulizer every 6 hours  amLODIPine   Tablet 5 milliGRAM(s) Oral daily  aspirin  chewable 81 milliGRAM(s) Oral daily  azithromycin  IVPB 500 milliGRAM(s) IV Intermittent every 24 hours  cefTRIAXone Injectable 1000 milliGRAM(s) IV Push every 24 hours  cyanocobalamin Injectable 1000 MICROGram(s) SubCutaneous daily  dorzolamide 2%/timolol 0.5% Ophthalmic Solution 1 Drop(s) Both EYES two times a day  ferrous    sulfate 325 milliGRAM(s) Oral two times a day with meals  guaiFENesin  milliGRAM(s) Oral every 12 hours  heparin  Injectable 5000 Unit(s) SubCutaneous every 8 hours  latanoprost 0.005% Ophthalmic Solution 1 Drop(s) Both EYES at bedtime  pantoprazole    Tablet 40 milliGRAM(s) Oral before breakfast  pilocarpine 4% Solution 1 Drop(s) Both EYES two times a day  predniSONE   Tablet 40 milliGRAM(s) Oral daily  simvastatin 20 milliGRAM(s) Oral at bedtime    MEDICATIONS  (PRN):  acetaminophen   Tablet 650 milliGRAM(s) Oral every 6 hours PRN For Temp greater than 38 C (100.4 F)  benzonatate 100 milliGRAM(s) Oral three times a day PRN Cough  docusate sodium 100 milliGRAM(s) Oral two times a day PRN Constipation  lidocaine   Patch 1 Patch Transdermal daily PRN back pain  melatonin 3 milliGRAM(s) Oral at bedtime PRN Insomnia  polyethylene glycol 3350 17 Gram(s) Oral daily PRN Constipation  RADIOLOGY & ADDITIONAL TESTS:    EXAM:  CHEST SINGLE VIEW FRONTAL                        PROCEDURE DATE:  12/22/2017      IMPRESSION:  Interval development of infiltrate and/or partial atelectasis in the left   lower lobe. Left lower lobe pneumonia is the consideration and clinical   correlation is recommended.      ASSESSMENT AND PLAN:  89F, PMH AS ABOVE A/W:    1. Dyspnea 2/2 to CAP/ LLL PNA  with superimposed RSV Bronchitis with Bronchospasm  -clinically improving  -on abx  -cultures neg  -taper steroids  -antitussives  -nebs    2. HTN  -not on meds, reports no H/o elevated  BP   -Started low dose amlodipine     3. HLD  -c/w statin    4. Microcytic Anemia  -unclear etiology  -anemia work up sent   -HAs B12 low,, start supplementation  -Iron studies with mixed picture   -Monitor H/H  closely  -Occult blood pending     5. Dehydration  -improved    6. Hx of colon CA and Breast CA  -treated.    7. DVT Px    8. Dispo:  social work tameka wilkinson planning

## 2017-12-27 ENCOUNTER — TRANSCRIPTION ENCOUNTER (OUTPATIENT)
Age: 82
End: 2017-12-27

## 2017-12-27 VITALS
TEMPERATURE: 98 F | OXYGEN SATURATION: 96 % | RESPIRATION RATE: 18 BRPM | HEART RATE: 74 BPM | SYSTOLIC BLOOD PRESSURE: 154 MMHG | DIASTOLIC BLOOD PRESSURE: 73 MMHG

## 2017-12-27 LAB
ANION GAP SERPL CALC-SCNC: 12 MMOL/L — SIGNIFICANT CHANGE UP (ref 5–17)
BASOPHILS # BLD AUTO: 0 K/UL — SIGNIFICANT CHANGE UP (ref 0–0.2)
BASOPHILS NFR BLD AUTO: 0.2 % — SIGNIFICANT CHANGE UP (ref 0–2)
BUN SERPL-MCNC: 31 MG/DL — HIGH (ref 7–23)
CALCIUM SERPL-MCNC: 8.5 MG/DL — SIGNIFICANT CHANGE UP (ref 8.5–10.1)
CHLORIDE SERPL-SCNC: 110 MMOL/L — HIGH (ref 96–108)
CO2 SERPL-SCNC: 20 MMOL/L — LOW (ref 22–31)
CREAT SERPL-MCNC: 1.06 MG/DL — SIGNIFICANT CHANGE UP (ref 0.5–1.3)
CULTURE RESULTS: SIGNIFICANT CHANGE UP
CULTURE RESULTS: SIGNIFICANT CHANGE UP
EOSINOPHIL # BLD AUTO: 0 K/UL — SIGNIFICANT CHANGE UP (ref 0–0.5)
EOSINOPHIL NFR BLD AUTO: 0.1 % — SIGNIFICANT CHANGE UP (ref 0–6)
GLUCOSE SERPL-MCNC: 121 MG/DL — HIGH (ref 70–99)
HCT VFR BLD CALC: 34 % — LOW (ref 34.5–45)
HGB BLD-MCNC: 10.7 G/DL — LOW (ref 11.5–15.5)
LYMPHOCYTES # BLD AUTO: 1.1 K/UL — SIGNIFICANT CHANGE UP (ref 1–3.3)
LYMPHOCYTES # BLD AUTO: 5.5 % — LOW (ref 13–44)
MCHC RBC-ENTMCNC: 25.2 PG — LOW (ref 27–34)
MCHC RBC-ENTMCNC: 31.6 GM/DL — LOW (ref 32–36)
MCV RBC AUTO: 79.7 FL — LOW (ref 80–100)
MONOCYTES # BLD AUTO: 0.3 K/UL — SIGNIFICANT CHANGE UP (ref 0–0.9)
MONOCYTES NFR BLD AUTO: 1.5 % — LOW (ref 2–14)
NEUTROPHILS # BLD AUTO: 18.4 K/UL — HIGH (ref 1.8–7.4)
NEUTROPHILS NFR BLD AUTO: 92.8 % — HIGH (ref 43–77)
PLATELET # BLD AUTO: 378 K/UL — SIGNIFICANT CHANGE UP (ref 150–400)
POTASSIUM SERPL-MCNC: 3.7 MMOL/L — SIGNIFICANT CHANGE UP (ref 3.5–5.3)
POTASSIUM SERPL-SCNC: 3.7 MMOL/L — SIGNIFICANT CHANGE UP (ref 3.5–5.3)
RBC # BLD: 4.26 M/UL — SIGNIFICANT CHANGE UP (ref 3.8–5.2)
RBC # FLD: 15.2 % — HIGH (ref 10.3–14.5)
SODIUM SERPL-SCNC: 142 MMOL/L — SIGNIFICANT CHANGE UP (ref 135–145)
SPECIMEN SOURCE: SIGNIFICANT CHANGE UP
SPECIMEN SOURCE: SIGNIFICANT CHANGE UP
WBC # BLD: 19.8 K/UL — HIGH (ref 3.8–10.5)
WBC # FLD AUTO: 19.8 K/UL — HIGH (ref 3.8–10.5)

## 2017-12-27 PROCEDURE — 71010: CPT | Mod: 26

## 2017-12-27 RX ORDER — DOCUSATE SODIUM 100 MG
1 CAPSULE ORAL
Qty: 0 | Refills: 0 | COMMUNITY
Start: 2017-12-27

## 2017-12-27 RX ORDER — PREGABALIN 225 MG/1
1 CAPSULE ORAL
Qty: 30 | Refills: 0 | OUTPATIENT
Start: 2017-12-27 | End: 2018-01-25

## 2017-12-27 RX ORDER — CEFUROXIME AXETIL 250 MG
1 TABLET ORAL
Qty: 6 | Refills: 0 | OUTPATIENT
Start: 2017-12-27 | End: 2017-12-29

## 2017-12-27 RX ORDER — POLYETHYLENE GLYCOL 3350 17 G/17G
17 POWDER, FOR SOLUTION ORAL
Qty: 0 | Refills: 0 | COMMUNITY
Start: 2017-12-27

## 2017-12-27 RX ORDER — AMLODIPINE BESYLATE 2.5 MG/1
1 TABLET ORAL
Qty: 30 | Refills: 0 | OUTPATIENT
Start: 2017-12-27 | End: 2018-01-25

## 2017-12-27 RX ORDER — AMLODIPINE BESYLATE 2.5 MG/1
1 TABLET ORAL
Qty: 0 | Refills: 0 | COMMUNITY
Start: 2017-12-27

## 2017-12-27 RX ORDER — FERROUS SULFATE 325(65) MG
1 TABLET ORAL
Qty: 0 | Refills: 0 | COMMUNITY
Start: 2017-12-27

## 2017-12-27 RX ADMIN — Medication 600 MILLIGRAM(S): at 06:02

## 2017-12-27 RX ADMIN — Medication 40 MILLIGRAM(S): at 06:03

## 2017-12-27 RX ADMIN — HEPARIN SODIUM 5000 UNIT(S): 5000 INJECTION INTRAVENOUS; SUBCUTANEOUS at 06:02

## 2017-12-27 RX ADMIN — PREGABALIN 1000 MICROGRAM(S): 225 CAPSULE ORAL at 13:14

## 2017-12-27 RX ADMIN — Medication 81 MILLIGRAM(S): at 13:16

## 2017-12-27 RX ADMIN — DORZOLAMIDE HYDROCHLORIDE TIMOLOL MALEATE 1 DROP(S): 20; 5 SOLUTION/ DROPS OPHTHALMIC at 06:04

## 2017-12-27 RX ADMIN — AMLODIPINE BESYLATE 5 MILLIGRAM(S): 2.5 TABLET ORAL at 06:02

## 2017-12-27 RX ADMIN — Medication 1 DROP(S): at 06:04

## 2017-12-27 RX ADMIN — PANTOPRAZOLE SODIUM 40 MILLIGRAM(S): 20 TABLET, DELAYED RELEASE ORAL at 06:02

## 2017-12-27 NOTE — DISCHARGE NOTE ADULT - CONDITIONS AT DISCHARGE
if there is increasing temperatures with cough noted; feelings of fatigue or chills; seek medical attention right away.   finish all medications as prescribed.

## 2017-12-27 NOTE — DISCHARGE NOTE ADULT - HOSPITAL COURSE
89 yo F with pmh HTN, HLD, breast ca s/p treatment, colon ca s/p treatment, who  p/w dyspnea x 1 day. Was recently seen by pmd for URI and was started on Z-pack for the same along with prednisone. No fevers, chills, nausea or vomiting.   CXR c/w LLL infiltrate and pt was admitted with pna. She was also found to have superimposed RSV. While here she was treated with rocephin/zithromax and steroids. Her condition has clinically improved. Her leukocytosis at this time is felt to be related to steroids. Her repeat CXR has improved. She was found to be b12 deficient and was started on supplementation.  She was also started on norvasc for htn. She was seen by physical therapy and she will be discharged home today on po ceftin and prednisone to complete treatment. . She is advised outpatient f/u with her PCP in 1-2 weeks.   Vital Signs Last 24 Hrs  T(C): 36.9 (27 Dec 2017 10:33), Max: 36.9 (27 Dec 2017 10:33)  T(F): 98.5 (27 Dec 2017 10:33), Max: 98.5 (27 Dec 2017 10:33)  HR: 74 (27 Dec 2017 10:33) (71 - 77)  BP: 154/73 (27 Dec 2017 10:33) (140/68 - 176/72)  RR: 18 (27 Dec 2017 10:33) (17 - 18)  SpO2: 96% (27 Dec 2017 10:33) (96% - 99%)    PHYSICAL EXAM:    GENERAL: Elderly female, sitting in chair, Comfortable, no acute distress   HEAD:  Normocephalic, atraumatic  EYES: EOMI, PERRLA  HEENT: Moist mucous membranes  NECK: Supple, No JVD  NERVOUS SYSTEM:  Alert & Oriented X3, forgetful, Motor Strength 5/5 B/L upper and lower extremities  CHEST/LUNG: coarse breath sounds at bases. No active wheeze  HEART: s1, s2+, Regular rate and rhythm  ABDOMEN: Soft, Nontender, Nondistended, Bowel sounds present  GENITOURINARY: Voiding, no palpable bladder  EXTREMITIES:   No clubbing, cyanosis, or edema  MUSCULOSKELETAL- No muscle tenderness, no joint tenderness  SKIN-no rash    LABS:                        10.7   19.8  )-----------( 378      ( 27 Dec 2017 09:39 )             34.0     12-27    142  |  110<H>  |  31<H>  ----------------------------<  121<H>  3.7   |  20<L>  |  1.06    Ca    8.5      27 Dec 2017 09:39    Xray Chest 1 View AP/PA. (12.27.17 @ 09:25) >    Impression:  1. No evidence of acute pulmonary disease.          Final diagnosis:  1. Dyspnea 2/2 to CAP/ LLL PNA  with superimposed RSV Bronchitis with Bronchospasm  2. HTN  -Started low dose amlodipine   3. HLD    4. Microcytic Anemia/b12 deficiency  5. Dehydration  6. Hx of colon CA and Breast CA    time taken for dc 75 min  plan d/w patient in detail  left message for pcp Dr. Fletcher

## 2017-12-27 NOTE — DISCHARGE NOTE ADULT - CARE PROVIDER_API CALL
Ana Fletcher), Medicine  [address] [place] [address]  Phone: [phone]  Fax: [phone]

## 2017-12-27 NOTE — DISCHARGE NOTE ADULT - PATIENT PORTAL LINK FT
“You can access the FollowHealth Patient Portal, offered by [facility], by registering with the following website: [email or web address]”

## 2017-12-27 NOTE — DISCHARGE NOTE ADULT - CARE PLAN
Principal Discharge DX:	Pneumonia  Goal:	resolution  Instructions for follow-up, activity and diet:	take antibiotics as advised  follow up with dr. Fletcher in 1-2 weeks for follow up.

## 2017-12-30 ENCOUNTER — EMERGENCY (EMERGENCY)
Facility: HOSPITAL | Age: 82
LOS: 0 days | Discharge: HOME | End: 2017-12-30
Attending: EMERGENCY MEDICINE | Admitting: EMERGENCY MEDICINE
Payer: MEDICARE

## 2017-12-30 VITALS
DIASTOLIC BLOOD PRESSURE: 80 MMHG | HEART RATE: 88 BPM | HEIGHT: 68 IN | WEIGHT: 145.06 LBS | OXYGEN SATURATION: 98 % | TEMPERATURE: 98 F | RESPIRATION RATE: 14 BRPM | SYSTOLIC BLOOD PRESSURE: 153 MMHG

## 2017-12-30 VITALS
HEART RATE: 80 BPM | TEMPERATURE: 97 F | OXYGEN SATURATION: 95 % | SYSTOLIC BLOOD PRESSURE: 132 MMHG | DIASTOLIC BLOOD PRESSURE: 68 MMHG | RESPIRATION RATE: 16 BRPM

## 2017-12-30 DIAGNOSIS — G89.29 OTHER CHRONIC PAIN: ICD-10-CM

## 2017-12-30 DIAGNOSIS — R94.31 ABNORMAL ELECTROCARDIOGRAM [ECG] [EKG]: ICD-10-CM

## 2017-12-30 DIAGNOSIS — M54.6 PAIN IN THORACIC SPINE: ICD-10-CM

## 2017-12-30 DIAGNOSIS — K59.00 CONSTIPATION, UNSPECIFIED: ICD-10-CM

## 2017-12-30 DIAGNOSIS — Z85.038 PERSONAL HISTORY OF OTHER MALIGNANT NEOPLASM OF LARGE INTESTINE: ICD-10-CM

## 2017-12-30 DIAGNOSIS — Z85.3 PERSONAL HISTORY OF MALIGNANT NEOPLASM OF BREAST: ICD-10-CM

## 2017-12-30 DIAGNOSIS — Z90.11 ACQUIRED ABSENCE OF RIGHT BREAST AND NIPPLE: ICD-10-CM

## 2017-12-30 DIAGNOSIS — Z98.890 OTHER SPECIFIED POSTPROCEDURAL STATES: ICD-10-CM

## 2017-12-30 DIAGNOSIS — I10 ESSENTIAL (PRIMARY) HYPERTENSION: ICD-10-CM

## 2017-12-30 DIAGNOSIS — M54.9 DORSALGIA, UNSPECIFIED: ICD-10-CM

## 2017-12-30 DIAGNOSIS — M54.5 LOW BACK PAIN: ICD-10-CM

## 2017-12-30 PROBLEM — C80.1 MALIGNANT (PRIMARY) NEOPLASM, UNSPECIFIED: Chronic | Status: ACTIVE | Noted: 2017-12-22

## 2017-12-30 LAB
ALBUMIN SERPL ELPH-MCNC: 2.9 G/DL — LOW (ref 3.3–5)
ALP SERPL-CCNC: 66 U/L — SIGNIFICANT CHANGE UP (ref 40–120)
ALT FLD-CCNC: 33 U/L — SIGNIFICANT CHANGE UP (ref 12–78)
ANION GAP SERPL CALC-SCNC: 7 MMOL/L — SIGNIFICANT CHANGE UP (ref 5–17)
APPEARANCE UR: CLEAR — SIGNIFICANT CHANGE UP
AST SERPL-CCNC: 15 U/L — SIGNIFICANT CHANGE UP (ref 15–37)
BASOPHILS # BLD AUTO: 0.1 K/UL — SIGNIFICANT CHANGE UP (ref 0–0.2)
BASOPHILS NFR BLD AUTO: 0.6 % — SIGNIFICANT CHANGE UP (ref 0–2)
BILIRUB SERPL-MCNC: 0.4 MG/DL — SIGNIFICANT CHANGE UP (ref 0.2–1.2)
BILIRUB UR-MCNC: NEGATIVE — SIGNIFICANT CHANGE UP
BUN SERPL-MCNC: 26 MG/DL — HIGH (ref 7–23)
CALCIUM SERPL-MCNC: 8.3 MG/DL — LOW (ref 8.5–10.1)
CHLORIDE SERPL-SCNC: 105 MMOL/L — SIGNIFICANT CHANGE UP (ref 96–108)
CO2 SERPL-SCNC: 26 MMOL/L — SIGNIFICANT CHANGE UP (ref 22–31)
COLOR SPEC: YELLOW — SIGNIFICANT CHANGE UP
CREAT SERPL-MCNC: 0.99 MG/DL — SIGNIFICANT CHANGE UP (ref 0.5–1.3)
DIFF PNL FLD: NEGATIVE — SIGNIFICANT CHANGE UP
EOSINOPHIL # BLD AUTO: 0.2 K/UL — SIGNIFICANT CHANGE UP (ref 0–0.5)
EOSINOPHIL NFR BLD AUTO: 1.1 % — SIGNIFICANT CHANGE UP (ref 0–6)
GLUCOSE SERPL-MCNC: 91 MG/DL — SIGNIFICANT CHANGE UP (ref 70–99)
GLUCOSE UR QL: NEGATIVE MG/DL — SIGNIFICANT CHANGE UP
HCT VFR BLD CALC: 34.4 % — LOW (ref 34.5–45)
HGB BLD-MCNC: 10.6 G/DL — LOW (ref 11.5–15.5)
KETONES UR-MCNC: NEGATIVE — SIGNIFICANT CHANGE UP
LEUKOCYTE ESTERASE UR-ACNC: NEGATIVE — SIGNIFICANT CHANGE UP
LYMPHOCYTES # BLD AUTO: 15.7 % — SIGNIFICANT CHANGE UP (ref 13–44)
LYMPHOCYTES # BLD AUTO: 2.4 K/UL — SIGNIFICANT CHANGE UP (ref 1–3.3)
MCHC RBC-ENTMCNC: 24.5 PG — LOW (ref 27–34)
MCHC RBC-ENTMCNC: 30.6 GM/DL — LOW (ref 32–36)
MCV RBC AUTO: 79.8 FL — LOW (ref 80–100)
MONOCYTES # BLD AUTO: 1.1 K/UL — HIGH (ref 0–0.9)
MONOCYTES NFR BLD AUTO: 7.3 % — SIGNIFICANT CHANGE UP (ref 2–14)
NEUTROPHILS # BLD AUTO: 11.5 K/UL — HIGH (ref 1.8–7.4)
NEUTROPHILS NFR BLD AUTO: 75.3 % — SIGNIFICANT CHANGE UP (ref 43–77)
NITRITE UR-MCNC: NEGATIVE — SIGNIFICANT CHANGE UP
PH UR: 6.5 — SIGNIFICANT CHANGE UP (ref 5–8)
PLATELET # BLD AUTO: 348 K/UL — SIGNIFICANT CHANGE UP (ref 150–400)
POTASSIUM SERPL-MCNC: 3.8 MMOL/L — SIGNIFICANT CHANGE UP (ref 3.5–5.3)
POTASSIUM SERPL-SCNC: 3.8 MMOL/L — SIGNIFICANT CHANGE UP (ref 3.5–5.3)
PROT SERPL-MCNC: 7 GM/DL — SIGNIFICANT CHANGE UP (ref 6–8.3)
PROT UR-MCNC: NEGATIVE MG/DL — SIGNIFICANT CHANGE UP
RBC # BLD: 4.31 M/UL — SIGNIFICANT CHANGE UP (ref 3.8–5.2)
RBC # FLD: 16 % — HIGH (ref 10.3–14.5)
SODIUM SERPL-SCNC: 138 MMOL/L — SIGNIFICANT CHANGE UP (ref 135–145)
SP GR SPEC: 1.01 — SIGNIFICANT CHANGE UP (ref 1.01–1.02)
UROBILINOGEN FLD QL: NEGATIVE MG/DL — SIGNIFICANT CHANGE UP
WBC # BLD: 15.3 K/UL — HIGH (ref 3.8–10.5)
WBC # FLD AUTO: 15.3 K/UL — HIGH (ref 3.8–10.5)

## 2017-12-30 PROCEDURE — 93010 ELECTROCARDIOGRAM REPORT: CPT

## 2017-12-30 PROCEDURE — 74177 CT ABD & PELVIS W/CONTRAST: CPT | Mod: 26

## 2017-12-30 PROCEDURE — 99285 EMERGENCY DEPT VISIT HI MDM: CPT

## 2017-12-30 RX ORDER — MORPHINE SULFATE 50 MG/1
2 CAPSULE, EXTENDED RELEASE ORAL ONCE
Qty: 0 | Refills: 0 | Status: DISCONTINUED | OUTPATIENT
Start: 2017-12-30 | End: 2017-12-30

## 2017-12-30 RX ORDER — SENNA PLUS 8.6 MG/1
2 TABLET ORAL
Qty: 10 | Refills: 0 | OUTPATIENT
Start: 2017-12-30 | End: 2018-01-03

## 2017-12-30 RX ORDER — SODIUM CHLORIDE 9 MG/ML
1000 INJECTION INTRAMUSCULAR; INTRAVENOUS; SUBCUTANEOUS ONCE
Qty: 0 | Refills: 0 | Status: COMPLETED | OUTPATIENT
Start: 2017-12-30 | End: 2017-12-30

## 2017-12-30 RX ORDER — DOCUSATE SODIUM 100 MG
1 CAPSULE ORAL
Qty: 10 | Refills: 0
Start: 2017-12-30 | End: 2018-01-03

## 2017-12-30 RX ORDER — POLYETHYLENE GLYCOL 3350 17 G/17G
17 POWDER, FOR SOLUTION ORAL
Qty: 51 | Refills: 0 | OUTPATIENT
Start: 2017-12-30 | End: 2018-01-01

## 2017-12-30 RX ADMIN — SODIUM CHLORIDE 1000 MILLILITER(S): 9 INJECTION INTRAMUSCULAR; INTRAVENOUS; SUBCUTANEOUS at 09:02

## 2017-12-30 RX ADMIN — MORPHINE SULFATE 2 MILLIGRAM(S): 50 CAPSULE, EXTENDED RELEASE ORAL at 09:02

## 2017-12-30 NOTE — ED PROVIDER NOTE - OBJECTIVE STATEMENT
89 yo with history of htn, chronic back pain s/p back and hip surgeries and colon cancer presenting with back pain since last night and abdominal pain. Pt states she has not had a bowel movement since discharged 3 days prior. Pt was admitted to the hospital for a pneumonia and was hospitalized for 1 week. Pt initially presented with dyspnea. Symptoms in that regard have now improved. Denies fevers, chills, cough, rhinorrhea, otorrhea, otalgia, nausea, vomiting, diarrhea, chest pain, shortness of breath or changes in urinary habits. Pt states she has never had pain like this before and is unlike her normal back pain. Pt does not normally take pain medications on a daily basis. Pain worsens with movement

## 2017-12-30 NOTE — ED PROVIDER NOTE - MEDICAL DECISION MAKING DETAILS
89 yo F with back pain - likely related to recent hospitalization - however given pressure sensation in the abdomen - will get labs, urine and CT

## 2017-12-30 NOTE — ED PROVIDER NOTE - ATTENDING CONTRIBUTION TO CARE
Dr. Grady: I have personally performed a face to face bedside history and physical examination of this patient. I have discussed the history, examination, review of systems, assessment and plan of management with the resident. I have reviewed the electronic medical record and amended it to reflect my history, review of systems, physical exam, assessment and plan.

## 2017-12-30 NOTE — ED PROVIDER NOTE - CARE PLAN
Principal Discharge DX:	Chronic left-sided thoracic back pain Principal Discharge DX:	Chronic left-sided thoracic back pain  Secondary Diagnosis:	Constipation  Secondary Diagnosis:	Back pain

## 2017-12-30 NOTE — ED PROVIDER NOTE - PROGRESS NOTE DETAILS
ED Attending Dr. Grady evaluation: 89 y/o female c/o dull/sharp low back pain radiating to upper abd pain, chronic, worsening after dc. Pt states that she was dc'd 3 days ago. No known injury. Worsened with movement/positional change. Pain is minimal at rest. No BM since admission to hospital last time. Acceptable appetite. +flatus. Denies N/V/fever. Ct shows worsened T12 fracture, pain likely referred from site. Pt is constipated without signs of obstruction. Urine clean. Will have patient follow up with PMD

## 2017-12-30 NOTE — ED ADULT TRIAGE NOTE - HEART RATE (BEATS/MIN)
88
Breathing spontaneous and unlabored. Breath sounds clear and equal bilaterally with regular rhythm.

## 2017-12-30 NOTE — ED ADULT NURSE REASSESSMENT NOTE - NS ED NURSE REASSESS COMMENT FT1
Patient refusing blood work. Asked again at a later time and patient not communicating at all. Sitting on side of stretcher staring straight ahead with hands on lap palms facing up and circles made with 2 fingers. Will attempt again.

## 2017-12-31 LAB
CULTURE RESULTS: SIGNIFICANT CHANGE UP
SPECIMEN SOURCE: SIGNIFICANT CHANGE UP

## 2018-01-03 DIAGNOSIS — Z85.3 PERSONAL HISTORY OF MALIGNANT NEOPLASM OF BREAST: ICD-10-CM

## 2018-01-03 DIAGNOSIS — I10 ESSENTIAL (PRIMARY) HYPERTENSION: ICD-10-CM

## 2018-01-03 DIAGNOSIS — J15.6 PNEUMONIA DUE TO OTHER GRAM-NEGATIVE BACTERIA: ICD-10-CM
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
## 2018-01-03 DIAGNOSIS — J20.5 ACUTE BRONCHITIS DUE TO RESPIRATORY SYNCYTIAL VIRUS: ICD-10-CM

## 2018-01-03 DIAGNOSIS — Z85.038 PERSONAL HISTORY OF OTHER MALIGNANT NEOPLASM OF LARGE INTESTINE: ICD-10-CM

## 2018-01-03 DIAGNOSIS — D72.829 ELEVATED WHITE BLOOD CELL COUNT, UNSPECIFIED: ICD-10-CM

## 2018-01-03 DIAGNOSIS — T38.0X5A ADVERSE EFFECT OF GLUCOCORTICOIDS AND SYNTHETIC ANALOGUES, INITIAL ENCOUNTER: ICD-10-CM

## 2018-01-03 DIAGNOSIS — D50.9 IRON DEFICIENCY ANEMIA, UNSPECIFIED: ICD-10-CM

## 2018-01-03 DIAGNOSIS — E78.5 HYPERLIPIDEMIA, UNSPECIFIED: ICD-10-CM

## 2018-01-03 DIAGNOSIS — E86.0 DEHYDRATION: ICD-10-CM

## 2018-01-03 DIAGNOSIS — R06.02 SHORTNESS OF BREATH: ICD-10-CM

## 2018-01-08 ENCOUNTER — EMERGENCY (EMERGENCY)
Facility: HOSPITAL | Age: 83
LOS: 0 days | Discharge: HOME | End: 2018-01-08
Attending: EMERGENCY MEDICINE | Admitting: EMERGENCY MEDICINE
Payer: MEDICARE

## 2018-01-08 VITALS
RESPIRATION RATE: 17 BRPM | SYSTOLIC BLOOD PRESSURE: 152 MMHG | HEART RATE: 85 BPM | DIASTOLIC BLOOD PRESSURE: 77 MMHG | HEIGHT: 63 IN | TEMPERATURE: 98 F | WEIGHT: 139.99 LBS

## 2018-01-08 DIAGNOSIS — Z85.3 PERSONAL HISTORY OF MALIGNANT NEOPLASM OF BREAST: ICD-10-CM

## 2018-01-08 DIAGNOSIS — M54.9 DORSALGIA, UNSPECIFIED: ICD-10-CM

## 2018-01-08 PROCEDURE — 99283 EMERGENCY DEPT VISIT LOW MDM: CPT

## 2018-01-08 RX ORDER — OXYCODONE HYDROCHLORIDE 5 MG/1
5 TABLET ORAL ONCE
Qty: 0 | Refills: 0 | Status: DISCONTINUED | OUTPATIENT
Start: 2018-01-08 | End: 2018-01-08

## 2018-01-08 RX ORDER — SIMVASTATIN 20 MG/1
1 TABLET, FILM COATED ORAL
Qty: 0 | Refills: 0 | COMMUNITY

## 2018-01-08 RX ORDER — DEXAMETHASONE 0.5 MG/5ML
10 ELIXIR ORAL ONCE
Qty: 0 | Refills: 0 | Status: COMPLETED | OUTPATIENT
Start: 2018-01-08 | End: 2018-01-08

## 2018-01-08 RX ORDER — OXYCODONE HYDROCHLORIDE 5 MG/1
10 TABLET ORAL ONCE
Qty: 0 | Refills: 0 | Status: DISCONTINUED | OUTPATIENT
Start: 2018-01-08 | End: 2018-01-08

## 2018-01-08 RX ORDER — ASPIRIN/CALCIUM CARB/MAGNESIUM 324 MG
1 TABLET ORAL
Qty: 0 | Refills: 0 | COMMUNITY

## 2018-01-08 RX ORDER — LIDOCAINE 4 G/100G
1 CREAM TOPICAL ONCE
Qty: 0 | Refills: 0 | Status: COMPLETED | OUTPATIENT
Start: 2018-01-08 | End: 2018-01-08

## 2018-01-08 RX ORDER — ACETAMINOPHEN 500 MG
975 TABLET ORAL ONCE
Qty: 0 | Refills: 0 | Status: COMPLETED | OUTPATIENT
Start: 2018-01-08 | End: 2018-01-08

## 2018-01-08 RX ADMIN — OXYCODONE HYDROCHLORIDE 5 MILLIGRAM(S): 5 TABLET ORAL at 16:32

## 2018-01-08 RX ADMIN — Medication 975 MILLIGRAM(S): at 13:00

## 2018-01-08 RX ADMIN — LIDOCAINE 1 PATCH: 4 CREAM TOPICAL at 11:54

## 2018-01-08 RX ADMIN — Medication 10 MILLIGRAM(S): at 11:54

## 2018-01-08 RX ADMIN — OXYCODONE HYDROCHLORIDE 10 MILLIGRAM(S): 5 TABLET ORAL at 13:00

## 2018-01-08 RX ADMIN — OXYCODONE HYDROCHLORIDE 10 MILLIGRAM(S): 5 TABLET ORAL at 11:54

## 2018-01-08 RX ADMIN — Medication 975 MILLIGRAM(S): at 11:54

## 2018-01-08 NOTE — ED ADULT NURSE NOTE - OBJECTIVE STATEMENT
c/o chronic back pain. pt states she has a lumbar fusion 2 years ago, and has been having increasing lower back pain that radiates to  BL flank

## 2018-01-08 NOTE — PHYSICAL THERAPY INITIAL EVALUATION ADULT - MODALITIES TREATMENT COMMENTS
Pt tolerated eval well, will benefit from continued skilled PT intervention to address back pain and return pt to PLOF. Pt returned to bed post encounter, NAD.

## 2018-01-08 NOTE — ED PROVIDER NOTE - PROGRESS NOTE DETAILS
MD Kenneth: patient pain improved, able to ambulate with walker but with assistance, social work found rehab facility for patient, will d/c to rehab. Attending Tere: PE: AAOx3 NAD, Cardiac S1S2 no mrg, Resp CTAB, Abd soft NTND, Neuro no focal deficits  Pt able to walk with walker and assistance. family brought to ED bc concerned about pts living situation - seeking rehab placement. SW able to place pt in rehab facility, family and pt agreeable

## 2018-01-08 NOTE — ED PROVIDER NOTE - ATTENDING CONTRIBUTION TO CARE
I, Marcellus Carranza MD, personally saw the patient with resident.  I have personally performed a face to face diagnostic evaluation on this patient.  I have reviewed the resident note and agree with the history, exam, and plan of care, except as noted.

## 2018-01-08 NOTE — PHYSICAL THERAPY INITIAL EVALUATION ADULT - LIVES WITH, PROFILE
alone/Pt lives alone, has a HHA 3 days a weeks for 5 hours, 1 step to enter home, uses RW at baseline.

## 2018-01-08 NOTE — ED PROVIDER NOTE - CARE PLAN
Principal Discharge DX:	Back pain  Instructions for follow-up, activity and diet:	Patient seen in the ED for back pain, Has known T12 fracture, no concerning symptoms or signs for cord compression at this time. She improved with symptomatic treatment.   1) Take Tylenol 325mg tablet, take 2 tablets every 6-8 hours as needed for pain   2) Discuss with doctors as rehab facility to take stronger pain medications for your back pain. Also please show them the labwork and imaging from your last visit in the ED that was given to you.  3) Please follow up with your primary care doctor in 1-2 days, call for appointment. If you do not have one or are unable to get an appointment please call [facility] at [phone] to make an appointment.   4) Return to the ED for worsening back pain, trouble urinating, bowel or bladder incontinence, numbness and tingling, fever greater than 100.4, nausea, vomiting, chest pain, shortness of breath, or if you have any other new, worsening, or concerning symptoms.

## 2018-01-08 NOTE — ED PROVIDER NOTE - MEDICAL DECISION MAKING DETAILS
89 year old female past medical history HTN, colon CA in past, T12 fracture, spinal fusion, who presents to the ED for back pain. Patient with known degenerative changes. No red flag signs or symptoms concerning for cord compression. Will treat with symptomatic control at this time and reeval.

## 2018-01-08 NOTE — ED PROVIDER NOTE - PLAN OF CARE
Patient seen in the ED for back pain, Has known T12 fracture, no concerning symptoms or signs for cord compression at this time. She improved with symptomatic treatment.   1) Take Tylenol 325mg tablet, take 2 tablets every 6-8 hours as needed for pain   2) Discuss with doctors as rehab facility to take stronger pain medications for your back pain. Also please show them the labwork and imaging from your last visit in the ED that was given to you.  3) Please follow up with your primary care doctor in 1-2 days, call for appointment. If you do not have one or are unable to get an appointment please call [facility] at [phone] to make an appointment.   4) Return to the ED for worsening back pain, trouble urinating, bowel or bladder incontinence, numbness and tingling, fever greater than 100.4, nausea, vomiting, chest pain, shortness of breath, or if you have any other new, worsening, or concerning symptoms.

## 2018-01-08 NOTE — PHYSICAL THERAPY INITIAL EVALUATION ADULT - GAIT DEVIATIONS NOTED, PT EVAL
decreased step length/decreased artemio/decreased weight-shifting ability/Pt presents with antalgic LLE.

## 2018-01-08 NOTE — PHYSICAL THERAPY INITIAL EVALUATION ADULT - GENERAL OBSERVATIONS, REHAB EVAL
Pt rec'd supine in bed, in hallway of ED, dtr and HHA at bedside. Pt reports minimal pain at rest, however was recently given pain medication and lidocaine patch.

## 2018-01-08 NOTE — ED PROVIDER NOTE - OBJECTIVE STATEMENT
89 year old female past medical history HTN, colon CA in past, T12 fracture, spinal fusion, who presents to the ED for back pain. Reports right lumbar back pain, radiating to abdomen bilaterally, worse with movement, unrelieved with percocet at home. No associated fevers, chills. baseline bladder incontinence prior to back pain. No urinary retention. No bowel incontinence. No paresthesias, saddle anesthesia. No chest pain, shortness of breath, abdominal pain, nausea, vomiting. No trauma.

## 2018-06-27 ENCOUNTER — APPOINTMENT (OUTPATIENT)
Dept: DERMATOLOGY | Facility: CLINIC | Age: 83
End: 2018-06-27

## 2018-08-01 PROBLEM — C50.919 MALIGNANT NEOPLASM OF UNSPECIFIED SITE OF UNSPECIFIED FEMALE BREAST: Chronic | Status: ACTIVE | Noted: 2017-12-22

## 2018-08-31 ENCOUNTER — APPOINTMENT (OUTPATIENT)
Dept: DERMATOLOGY | Facility: CLINIC | Age: 83
End: 2018-08-31
Payer: MEDICARE

## 2018-08-31 VITALS — BODY MASS INDEX: 25.16 KG/M2 | WEIGHT: 142 LBS | HEIGHT: 63 IN

## 2018-08-31 DIAGNOSIS — Z85.038 PERSONAL HISTORY OF OTHER MALIGNANT NEOPLASM OF LARGE INTESTINE: ICD-10-CM

## 2018-08-31 DIAGNOSIS — Z87.898 PERSONAL HISTORY OF OTHER SPECIFIED CONDITIONS: ICD-10-CM

## 2018-08-31 PROCEDURE — 99213 OFFICE O/P EST LOW 20 MIN: CPT | Mod: 25

## 2018-08-31 PROCEDURE — 17000 DESTRUCT PREMALG LESION: CPT

## 2018-08-31 PROCEDURE — 17003 DESTRUCT PREMALG LES 2-14: CPT

## 2018-08-31 RX ORDER — BROMFENAC 0.76 MG/ML
0.07 SOLUTION/ DROPS OPHTHALMIC
Refills: 0 | Status: ACTIVE | COMMUNITY

## 2018-08-31 RX ORDER — DORZOLAMIDE HYDROCHLORIDE AND TIMOLOL MALEATE 20; 5 MG/ML; MG/ML
22.3-6.8 SOLUTION/ DROPS OPHTHALMIC
Refills: 0 | Status: ACTIVE | COMMUNITY

## 2018-08-31 RX ORDER — SIMVASTATIN 80 MG/1
TABLET, FILM COATED ORAL
Refills: 0 | Status: ACTIVE | COMMUNITY

## 2018-08-31 RX ORDER — ASPIRIN 81 MG
81 TABLET, DELAYED RELEASE (ENTERIC COATED) ORAL
Refills: 0 | Status: ACTIVE | COMMUNITY

## 2018-08-31 RX ORDER — MAGNESIUM CHLORIDE 64 MG
64-106 TABLET, DELAYED RELEASE (ENTERIC COATED) ORAL
Refills: 0 | Status: ACTIVE | COMMUNITY

## 2019-05-06 ENCOUNTER — APPOINTMENT (OUTPATIENT)
Dept: DERMATOLOGY | Facility: CLINIC | Age: 84
End: 2019-05-06
Payer: MEDICARE

## 2019-05-06 DIAGNOSIS — L82.0 INFLAMED SEBORRHEIC KERATOSIS: ICD-10-CM

## 2019-05-06 DIAGNOSIS — L57.0 ACTINIC KERATOSIS: ICD-10-CM

## 2019-05-06 PROCEDURE — 99213 OFFICE O/P EST LOW 20 MIN: CPT | Mod: 25

## 2019-05-06 PROCEDURE — 17003 DESTRUCT PREMALG LES 2-14: CPT

## 2019-05-06 PROCEDURE — 17000 DESTRUCT PREMALG LESION: CPT

## 2019-05-06 NOTE — PHYSICAL EXAM
[FreeTextEntry3] : Skin examination performed of the face, neck, chest, hands, lower legs;\par The patient is well, alert and oriented, pleasant and cooperative.\par Eyelids, conjunctivae, oral mucosa, digits and nails all normal.  \par No cervical adenopathy.\par \par \par Prior AKs healed well\par scaling erythematous papules; L nose, just inferior to prior treatment site;\par L upper forehead near hairline\par \par scaling erythematous papules; cheeks, arms, hands

## 2019-07-24 ENCOUNTER — APPOINTMENT (OUTPATIENT)
Dept: DERMATOLOGY | Facility: CLINIC | Age: 84
End: 2019-07-24
Payer: MEDICARE

## 2019-07-24 PROCEDURE — 99214 OFFICE O/P EST MOD 30 MIN: CPT

## 2019-07-24 RX ORDER — DORZOLAMIDE HYDROCHLORIDE TIMOLOL MALEATE 20; 5 MG/ML; MG/ML
22.3-6.8 SOLUTION/ DROPS OPHTHALMIC
Qty: 10 | Refills: 0 | Status: ACTIVE | COMMUNITY
Start: 2019-02-13

## 2019-07-24 RX ORDER — BUSPIRONE HYDROCHLORIDE 5 MG/1
5 TABLET ORAL
Qty: 90 | Refills: 0 | Status: ACTIVE | COMMUNITY
Start: 2019-07-08

## 2019-07-24 RX ORDER — FOLIC ACID 1 MG/1
1 TABLET ORAL
Qty: 90 | Refills: 0 | Status: ACTIVE | COMMUNITY
Start: 2019-01-02

## 2019-07-24 RX ORDER — HYDROCORTISONE 1 %
12 CREAM (GRAM) TOPICAL
Qty: 400 | Refills: 0 | Status: ACTIVE | COMMUNITY
Start: 2019-06-24

## 2019-07-24 RX ORDER — BROMFENAC SODIUM 0.7 MG/ML
0.07 SOLUTION/ DROPS OPHTHALMIC
Qty: 3 | Refills: 0 | Status: ACTIVE | COMMUNITY
Start: 2019-03-13

## 2019-07-24 RX ORDER — MUPIROCIN 20 MG/G
2 OINTMENT TOPICAL
Qty: 22 | Refills: 0 | Status: ACTIVE | COMMUNITY
Start: 2019-07-15

## 2019-07-24 NOTE — HISTORY OF PRESENT ILLNESS
[de-identified] : The patient has been fit in for urgent appointment. \par injured L forearm on car door yesterday;  \par for evaluation\par has mupirocin ointment from PMD

## 2019-07-24 NOTE — ASSESSMENT
[FreeTextEntry1] : Traumatic laceration L forearm\par clean, no signs infection\par dressed with mupirocin, telfa, coban;  continue at home at least 2-3 wks\par discussed with pt. and daughter\par f/u if fails to improve

## 2019-11-01 ENCOUNTER — EMERGENCY (EMERGENCY)
Facility: HOSPITAL | Age: 84
LOS: 0 days | Discharge: HOME | End: 2019-11-01
Attending: EMERGENCY MEDICINE
Payer: MEDICARE

## 2019-11-01 VITALS
HEIGHT: 63 IN | DIASTOLIC BLOOD PRESSURE: 76 MMHG | RESPIRATION RATE: 19 BRPM | OXYGEN SATURATION: 100 % | WEIGHT: 139.99 LBS | SYSTOLIC BLOOD PRESSURE: 178 MMHG | HEART RATE: 73 BPM | TEMPERATURE: 97 F

## 2019-11-01 VITALS — RESPIRATION RATE: 16 BRPM | HEART RATE: 75 BPM | SYSTOLIC BLOOD PRESSURE: 150 MMHG | DIASTOLIC BLOOD PRESSURE: 70 MMHG
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
## 2019-11-01 DIAGNOSIS — Z79.82 LONG TERM (CURRENT) USE OF ASPIRIN: ICD-10-CM

## 2019-11-01 DIAGNOSIS — R07.81 PLEURODYNIA: ICD-10-CM
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
## 2019-11-01 DIAGNOSIS — Z85.3 PERSONAL HISTORY OF MALIGNANT NEOPLASM OF BREAST: ICD-10-CM

## 2019-11-01 DIAGNOSIS — Z90.11 ACQUIRED ABSENCE OF RIGHT BREAST AND NIPPLE: ICD-10-CM

## 2019-11-01 DIAGNOSIS — R10.9 UNSPECIFIED ABDOMINAL PAIN: ICD-10-CM

## 2019-11-01 DIAGNOSIS — S51.812A LACERATION WITHOUT FOREIGN BODY OF LEFT FOREARM, INITIAL ENCOUNTER: ICD-10-CM

## 2019-11-01 DIAGNOSIS — S91.312A LACERATION WITHOUT FOREIGN BODY, LEFT FOOT, INITIAL ENCOUNTER: ICD-10-CM

## 2019-11-01 LAB
ALBUMIN SERPL ELPH-MCNC: 3.4 G/DL — SIGNIFICANT CHANGE UP (ref 3.3–5)
ALP SERPL-CCNC: 75 U/L — SIGNIFICANT CHANGE UP (ref 40–120)
ALT FLD-CCNC: 24 U/L — SIGNIFICANT CHANGE UP (ref 12–78)
ANION GAP SERPL CALC-SCNC: 6 MMOL/L — SIGNIFICANT CHANGE UP (ref 5–17)
AST SERPL-CCNC: 16 U/L — SIGNIFICANT CHANGE UP (ref 15–37)
BASOPHILS # BLD AUTO: 0.09 K/UL — SIGNIFICANT CHANGE UP (ref 0–0.2)
BASOPHILS NFR BLD AUTO: 0.9 % — SIGNIFICANT CHANGE UP (ref 0–2)
BILIRUB SERPL-MCNC: 0.3 MG/DL — SIGNIFICANT CHANGE UP (ref 0.2–1.2)
BUN SERPL-MCNC: 22 MG/DL — SIGNIFICANT CHANGE UP (ref 7–23)
CALCIUM SERPL-MCNC: 8.8 MG/DL — SIGNIFICANT CHANGE UP (ref 8.5–10.1)
CHLORIDE SERPL-SCNC: 110 MMOL/L — HIGH (ref 96–108)
CO2 SERPL-SCNC: 25 MMOL/L — SIGNIFICANT CHANGE UP (ref 22–31)
CREAT SERPL-MCNC: 1.07 MG/DL — SIGNIFICANT CHANGE UP (ref 0.5–1.3)
EOSINOPHIL # BLD AUTO: 0.37 K/UL — SIGNIFICANT CHANGE UP (ref 0–0.5)
EOSINOPHIL NFR BLD AUTO: 3.6 % — SIGNIFICANT CHANGE UP (ref 0–6)
GLUCOSE SERPL-MCNC: 100 MG/DL — HIGH (ref 70–99)
HCT VFR BLD CALC: 39.6 % — SIGNIFICANT CHANGE UP (ref 34.5–45)
HGB BLD-MCNC: 12.4 G/DL — SIGNIFICANT CHANGE UP (ref 11.5–15.5)
IMM GRANULOCYTES NFR BLD AUTO: 0.6 % — SIGNIFICANT CHANGE UP (ref 0–1.5)
LYMPHOCYTES # BLD AUTO: 1.36 K/UL — SIGNIFICANT CHANGE UP (ref 1–3.3)
LYMPHOCYTES # BLD AUTO: 13.4 % — SIGNIFICANT CHANGE UP (ref 13–44)
MCHC RBC-ENTMCNC: 27.1 PG — SIGNIFICANT CHANGE UP (ref 27–34)
MCHC RBC-ENTMCNC: 31.3 GM/DL — LOW (ref 32–36)
MCV RBC AUTO: 86.5 FL — SIGNIFICANT CHANGE UP (ref 80–100)
MONOCYTES # BLD AUTO: 0.9 K/UL — SIGNIFICANT CHANGE UP (ref 0–0.9)
MONOCYTES NFR BLD AUTO: 8.8 % — SIGNIFICANT CHANGE UP (ref 2–14)
NEUTROPHILS # BLD AUTO: 7.4 K/UL — SIGNIFICANT CHANGE UP (ref 1.8–7.4)
NEUTROPHILS NFR BLD AUTO: 72.7 % — SIGNIFICANT CHANGE UP (ref 43–77)
PLATELET # BLD AUTO: 230 K/UL — SIGNIFICANT CHANGE UP (ref 150–400)
POTASSIUM SERPL-MCNC: 4.1 MMOL/L — SIGNIFICANT CHANGE UP (ref 3.5–5.3)
POTASSIUM SERPL-SCNC: 4.1 MMOL/L — SIGNIFICANT CHANGE UP (ref 3.5–5.3)
PROT SERPL-MCNC: 7.7 GM/DL — SIGNIFICANT CHANGE UP (ref 6–8.3)
RBC # BLD: 4.58 M/UL — SIGNIFICANT CHANGE UP (ref 3.8–5.2)
RBC # FLD: 16 % — HIGH (ref 10.3–14.5)
SODIUM SERPL-SCNC: 141 MMOL/L — SIGNIFICANT CHANGE UP (ref 135–145)
WBC # BLD: 10.18 K/UL — SIGNIFICANT CHANGE UP (ref 3.8–10.5)
WBC # FLD AUTO: 10.18 K/UL — SIGNIFICANT CHANGE UP (ref 3.8–10.5)

## 2019-11-01 PROCEDURE — 70450 CT HEAD/BRAIN W/O DYE: CPT | Mod: 26

## 2019-11-01 PROCEDURE — 36415 COLL VENOUS BLD VENIPUNCTURE: CPT

## 2019-11-01 PROCEDURE — 99284 EMERGENCY DEPT VISIT MOD MDM: CPT

## 2019-11-01 PROCEDURE — 74176 CT ABD & PELVIS W/O CONTRAST: CPT

## 2019-11-01 PROCEDURE — 72125 CT NECK SPINE W/O DYE: CPT

## 2019-11-01 PROCEDURE — 71250 CT THORAX DX C-: CPT | Mod: 26

## 2019-11-01 PROCEDURE — 71250 CT THORAX DX C-: CPT

## 2019-11-01 PROCEDURE — 74176 CT ABD & PELVIS W/O CONTRAST: CPT | Mod: 26

## 2019-11-01 PROCEDURE — 70450 CT HEAD/BRAIN W/O DYE: CPT

## 2019-11-01 PROCEDURE — 99284 EMERGENCY DEPT VISIT MOD MDM: CPT | Mod: 25

## 2019-11-01 PROCEDURE — 80053 COMPREHEN METABOLIC PANEL: CPT

## 2019-11-01 PROCEDURE — 85025 COMPLETE CBC W/AUTO DIFF WBC: CPT

## 2019-11-01 PROCEDURE — 90715 TDAP VACCINE 7 YRS/> IM: CPT

## 2019-11-01 PROCEDURE — 90471 IMMUNIZATION ADMIN: CPT

## 2019-11-01 PROCEDURE — 72125 CT NECK SPINE W/O DYE: CPT | Mod: 26

## 2019-11-01 RX ORDER — TETANUS TOXOID, REDUCED DIPHTHERIA TOXOID AND ACELLULAR PERTUSSIS VACCINE, ADSORBED 5; 2.5; 8; 8; 2.5 [IU]/.5ML; [IU]/.5ML; UG/.5ML; UG/.5ML; UG/.5ML
0.5 SUSPENSION INTRAMUSCULAR ONCE
Refills: 0 | Status: COMPLETED | OUTPATIENT
Start: 2019-11-01 | End: 2019-11-01

## 2019-11-01 RX ORDER — ACETAMINOPHEN 500 MG
1000 TABLET ORAL ONCE
Refills: 0 | Status: COMPLETED | OUTPATIENT
Start: 2019-11-01 | End: 2019-11-01

## 2019-11-01 RX ORDER — METOPROLOL TARTRATE 50 MG
12.5 TABLET ORAL DAILY
Refills: 0 | Status: DISCONTINUED | OUTPATIENT
Start: 2019-11-01 | End: 2019-11-01

## 2019-11-01 RX ORDER — BACITRACIN ZINC 500 UNIT/G
1 OINTMENT IN PACKET (EA) TOPICAL ONCE
Refills: 0 | Status: COMPLETED | OUTPATIENT
Start: 2019-11-01 | End: 2019-11-01

## 2019-11-01 RX ADMIN — Medication 12.5 MILLIGRAM(S): at 08:59

## 2019-11-01 RX ADMIN — Medication 1000 MILLIGRAM(S): at 10:00

## 2019-11-01 RX ADMIN — Medication 1000 MILLIGRAM(S): at 08:58

## 2019-11-01 RX ADMIN — Medication 1 APPLICATION(S): at 10:34

## 2019-11-01 RX ADMIN — TETANUS TOXOID, REDUCED DIPHTHERIA TOXOID AND ACELLULAR PERTUSSIS VACCINE, ADSORBED 0.5 MILLILITER(S): 5; 2.5; 8; 8; 2.5 SUSPENSION INTRAMUSCULAR at 10:25

## 2019-11-01 NOTE — ED PROVIDER NOTE - OBJECTIVE STATEMENT
89 y/o female with PMHx of breast CA presents to ED with witnessed fall while using walker by aide. No LOC. C/o left sided rib and abd pain.

## 2019-11-01 NOTE — ED ADULT NURSE NOTE - NSIMPLEMENTINTERV_GEN_ALL_ED
Implemented All Fall with Harm Risk Interventions:  [place] to call system. Call bell, personal items and telephone within reach. Instruct patient to call for assistance. Room bathroom lighting operational. Non-slip footwear when patient is off stretcher. Physically safe environment: no spills, clutter or unnecessary equipment. Stretcher in lowest position, wheels locked, appropriate side rails in place. Provide visual cue, wrist band, yellow gown, etc. Monitor gait and stability. Monitor for mental status changes and reorient to person, place, and time. Review medications for side effects contributing to fall risk. Reinforce activity limits and safety measures with patient and family. Provide visual clues: red socks.

## 2019-11-01 NOTE — ED PROVIDER NOTE - SKIN, MLM
+Ecchymosis with small non-bleeding skin tear to left forearm. +Skin tear to the left proximal dorsum of foot.

## 2019-11-01 NOTE — ED PROVIDER NOTE - PATIENT PORTAL LINK FT
You can access the FollowMyHealth Patient Portal offered by [facility] by registering at the following website: [email or web address]. By joining [employer]’s FollowMyHealth portal, you will also be able to view your health information using other applications (apps) compatible with our system.

## 2019-11-01 NOTE — ED ADULT NURSE NOTE - OBJECTIVE STATEMENT
Pt resents to ED with health aide sp mechanical fall at 0400 this morning. Pt states she tripped over her walker and was lying on floor for two hours. Pt denies hitting her head, denies LOC. Pt AnOx4, co left rib/hip pain worse with inspiration. Pt with skin tear on her left wrist, left knee and left ankle. bleeding controlled

## 2019-11-01 NOTE — ED PROVIDER NOTE - CROS ED CONS ALL NEG
Pt presents for evaluation colitis. Recent [facility] hospitalization for diarrhea, hx U.C. C-scope notable for diffuse colitis, biopsies benign. Stool returned positive for Salmonella (sensitive). Pt treated with Lialda, Cipro (which she has completed), and had started prednisone, which was stopped until salmonella treated. Pt continues with diarrhea, although improved, less frequent. Small amounts of fresh blood. No significant pain. No fever or jaundice. Tolerating diet. Weight stable. No constipation. No rashes. Positive nocturnal diarrhea. No N/V.    REVIEW OF SYSTEMS:   Constitutional: Negative for fever, appetite change and unexpected weight change.  HENT: Negative for sore throat and trouble swallowing.  Eyes: Negative for visual disturbance.  Respiratory: Negative for chest tightness, shortness of breath and wheezing.  Cardiovascular: Negative for chest pain.  Gastrointestinal:  as per HPI    PHYSICAL EXAMINATION:                                                        GENERAL:  Comfortable, in no acute distress.      SKIN: Non-jaundiced.                             HEENT EXAM:  Nonicteric.  No adenopathy.  Oropharynx is clear.               NECK:  Supple.                                                               LUNGS:  Clear.                                                               CARDIAC:  Regular rate and rhythm.  S1, S2.  No murmur.                      ABDOMEN:  Soft, positive bowel sounds, nontender.  No hepatosplenomegaly or masses.  No rebound or guarding.                                             EXTREMITIES:  No edema.       IMP: 1. U.C. - persistent diarrhea.          2. Salmonella - s/p antibiotic therapy.    PLAN: 1. Resume prednisone taper (40 mg, 30, 20, 10 mg daily, decreasing at 3 day intervals). Pt to update office when down to 10 mg daily.             2. Continue Lialda 2.4 gm daily.             3. Daily probiotic.  
negative...

## 2019-11-01 NOTE — ED ADULT TRIAGE NOTE - CHIEF COMPLAINT QUOTE
pt BIBEMS s/p mechanical fall at home while reaching for walker. denies head strike, denies LOC. + lac to L forearm, c/o L rib pain. on daily ASA.

## 2020-07-23 ENCOUNTER — EMERGENCY (EMERGENCY)
Facility: HOSPITAL | Age: 85
LOS: 0 days | Discharge: HOME | End: 2020-07-23
Attending: EMERGENCY MEDICINE
Payer: MEDICARE

## 2020-07-23 VITALS
SYSTOLIC BLOOD PRESSURE: 188 MMHG | TEMPERATURE: 97 F | OXYGEN SATURATION: 100 % | RESPIRATION RATE: 18 BRPM | DIASTOLIC BLOOD PRESSURE: 80 MMHG | HEART RATE: 62 BPM

## 2020-07-23 VITALS
HEIGHT: 63 IN | OXYGEN SATURATION: 96 % | DIASTOLIC BLOOD PRESSURE: 82 MMHG | WEIGHT: 145.06 LBS | HEART RATE: 58 BPM | TEMPERATURE: 97 F | RESPIRATION RATE: 18 BRPM | SYSTOLIC BLOOD PRESSURE: 186 MMHG

## 2020-07-23 DIAGNOSIS — Z79.899 OTHER LONG TERM (CURRENT) DRUG THERAPY: ICD-10-CM
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
## 2020-07-23 DIAGNOSIS — E78.5 HYPERLIPIDEMIA, UNSPECIFIED: ICD-10-CM

## 2020-07-23 DIAGNOSIS — S81.812A LACERATION WITHOUT FOREIGN BODY, LEFT LOWER LEG, INITIAL ENCOUNTER: ICD-10-CM

## 2020-07-23 DIAGNOSIS — I10 ESSENTIAL (PRIMARY) HYPERTENSION: ICD-10-CM
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
## 2020-07-23 DIAGNOSIS — Z79.82 LONG TERM (CURRENT) USE OF ASPIRIN: ICD-10-CM

## 2020-07-23 DIAGNOSIS — Z85.3 PERSONAL HISTORY OF MALIGNANT NEOPLASM OF BREAST: ICD-10-CM

## 2020-07-23 PROCEDURE — 90471 IMMUNIZATION ADMIN: CPT

## 2020-07-23 PROCEDURE — 73590 X-RAY EXAM OF LOWER LEG: CPT | Mod: LT

## 2020-07-23 PROCEDURE — 73590 X-RAY EXAM OF LOWER LEG: CPT | Mod: 26,LT

## 2020-07-23 PROCEDURE — 99283 EMERGENCY DEPT VISIT LOW MDM: CPT

## 2020-07-23 PROCEDURE — 99283 EMERGENCY DEPT VISIT LOW MDM: CPT | Mod: 25

## 2020-07-23 PROCEDURE — 90715 TDAP VACCINE 7 YRS/> IM: CPT

## 2020-07-23 RX ORDER — TETANUS TOXOID, REDUCED DIPHTHERIA TOXOID AND ACELLULAR PERTUSSIS VACCINE, ADSORBED 5; 2.5; 8; 8; 2.5 [IU]/.5ML; [IU]/.5ML; UG/.5ML; UG/.5ML; UG/.5ML
0.5 SUSPENSION INTRAMUSCULAR ONCE
Refills: 0 | Status: COMPLETED | OUTPATIENT
Start: 2020-07-23 | End: 2020-07-23

## 2020-07-23 RX ORDER — AMLODIPINE BESYLATE 2.5 MG/1
5 TABLET ORAL ONCE
Refills: 0 | Status: DISCONTINUED | OUTPATIENT
Start: 2020-07-23 | End: 2020-07-23

## 2020-07-23 RX ORDER — BACITRACIN ZINC 500 UNIT/G
1 OINTMENT IN PACKET (EA) TOPICAL ONCE
Refills: 0 | Status: COMPLETED | OUTPATIENT
Start: 2020-07-23 | End: 2020-07-23

## 2020-07-23 RX ADMIN — Medication 1 APPLICATION(S): at 08:50

## 2020-07-23 RX ADMIN — TETANUS TOXOID, REDUCED DIPHTHERIA TOXOID AND ACELLULAR PERTUSSIS VACCINE, ADSORBED 0.5 MILLILITER(S): 5; 2.5; 8; 8; 2.5 SUSPENSION INTRAMUSCULAR at 08:50

## 2020-07-23 NOTE — ED PROVIDER NOTE - PHYSICAL EXAMINATION
*GEN: No acute distress, well appearing, aaox3  *HEAD: Normocephalic, Atraumatic  *EYES/NOSE: b/l Pupils symmetric & Reactive to ligth, EOMI b/l  *THROAT: airway patent, moist mucous membranes  *NECK: Neck supple  *PULMONARY: No Respiratory distress, symmetric b/l chest rise  *CARDIAC: s1s2, regular rhythm   *ABDOMEN:  Non Tender, Non Distended, soft, no guarding, no rebound, no masses   *BACK: no CVA tenderness, No midline vertebral tenderness to palpation   *EXTREMITIES: symmetric pulses, 2+ DP & radial pulses, no cyanosis, no edema   *SKIN: no rash, 2 superficial skin tears L tibial region  *NEUROLOGIC: alert,  full active & passive ROM in all 4 extremities, normal baseline gait w/ walker  *PSYCH: appropriate concern about symptoms, pleasant

## 2020-07-23 NOTE — ED ADULT TRIAGE NOTE - CHIEF COMPLAINT QUOTE
Patient presents with EMS from home, reports patient woke up and got up to go to the bathroom walking with walker reports she hit her lower left leg on a piece of furniture. Patient A&Ox3 at triage. Denies falling out of bed, bleeding controlled.

## 2020-07-23 NOTE — ED PROVIDER NOTE - CLINICAL SUMMARY MEDICAL DECISION MAKING FREE TEXT BOX
2 superficial skin tears L tibial region, no fx on xray, will update tetanus. ambulating w/ walker. bp elevated since pt didn't take home meds

## 2020-07-23 NOTE — ED PROVIDER NOTE - NSFOLLOWUPINSTRUCTIONS_ED_ALL_ED_FT
FOLLOW UP WITH PMD  WITHIN 1-2DAYS, CALL TO MAKE APPOINTMENT  COME BACK TO ED IF YOUR CONDITION WORSENS OR IF YOU DEVELOP FEVER GREATER THAN 100.4F, CHEST PAIN,  SHORTNESS OF BREATH OR ANY OTHER SYMPTOMS CONCERNING TO YOU  TAKE TYLENOL (ACETAMINOPHEN) 650 MG EVERY 6 HOURS AS NEEDED FOR PAIN    IF YOU WERE PRESRCIBED ANY MEDICATIONS FROM TODAY'S VISIT, TAKE THEM AS DIRECTED     Stitches, Staples, or Adhesive Wound Closure  Doctors use stitches (sutures), staples, and certain glue (skin adhesives) to hold your skin together while it heals (wound closure). You may need this treatment after you have surgery or if you cut your skin accidentally. These methods help your skin heal more quickly. They also make it less likely that you will have a scar.    What are the different kinds of wound closures?  There are many options for wound closure. The one that your doctor uses depends on how deep and large your wound is.    Adhesive Glue     To use this glue to close a wound, your doctor holds the edges of the wound together and paints the glue on the surface of your skin. You may need more than one layer of glue. Then the wound may be covered with a light bandage (dressing).    This type of skin closure may be used for small wounds that are not deep (superficial). Using glue for wound closure is less painful than other methods. It does not require a medicine that numbs the area. This method also leaves nothing to be removed. Adhesive glue is often used for children and on facial wounds.    Adhesive glue cannot be used for wounds that are deep, uneven, or bleeding. It is not used inside of a wound.    Adhesive Strips     These strips are made of sticky (adhesive), porous paper. They are placed across your skin edges like a regular adhesive bandage. You leave them on until they fall off.    Adhesive strips may be used to close very superficial wounds. They may also be used along with sutures to improve closure of your skin edges.    Sutures     Sutures are the oldest method of wound closure. Sutures can be made from natural or synthetic materials. They can be made from a material that your body can break down as your wound heals (absorbable), or they can be made from a material that needs to be removed from your skin (nonabsorbable). They come in many different strengths and sizes.    Your doctor attaches the sutures to a steel needle on one end. Sutures can be passed through your skin, or through the tissues beneath your skin. Then they are tied and cut. Your skin edges may be closed in one continuous stitch or in separate stitches.    Sutures are strong and can be used for all kinds of wounds. Absorbable sutures may be used to close tissues under the skin. The disadvantage of sutures is that they may cause skin reactions that lead to infection. Nonabsorbable sutures need to be removed.    Staples     When surgical staples are used to close a wound, the edges of your skin on both sides of the wound are brought close together. A staple is placed across the wound, and an instrument secures the edges together. Staples are often used to close surgical cuts (incisions).    Staples are faster to use than sutures, and they cause less reaction from your skin. Staples need to be removed using a tool that bends the staples away from your skin.    How do I care for my wound closure?  Take medicines only as told by your doctor.  If you were prescribed an antibiotic medicine for your wound, finish it all even if you start to feel better.  Use ointments or creams only as told by your doctor.  Wash your hands with soap and water before and after touching your wound.  Do not soak your wound in water. Do not take baths, swim, or use a hot tub until your doctor says it is okay.  Ask your doctor when you can start showering. Cover your wound if told by your doctor.  Do not take out your own sutures or staples.  Do not pick at your wound. Picking can cause an infection.  Keep all follow-up visits as told by your doctor. This is important.  How long will I have my wound closure?  Leave adhesive glue on your skin until the glue peels away.  Leave adhesive strips on your skin until they fall off.  Absorbable sutures will dissolve within several days.  Nonabsorbable sutures and staples must be removed. The location of the wound will determine how long they stay in. This can range from several days to a couple of weeks.    YOUR OSMAR WOUND NEEDS FOLLOW UP FOR A WOUND CHECK,     IF YOU HAD SUTURES WERE PLACED TODAY:  _________NO  SUTURES WERE PLACED  When should I seek help for my wound closure?  Contact your doctor if:    You have a fever.  You have chills.  You have redness, puffiness (swelling), or pain at the site of your wound.  You have fluid, blood, or pus coming from your wound.  There is a bad smell coming from your wound.  The skin edges of your wound start to separate after your sutures have been removed.  Your wound becomes thick, raised, and darker in color after your sutures come out (scarring).    This information is not intended to replace advice given to you by your health care provider. Make sure you discuss any questions you have with your health care provider.

## 2020-07-23 NOTE — ED ADULT NURSE NOTE - HIV OFFER
Unable to assess hearing Opt out Mildly to Moderately Impaired: difficulty hearing in some environments or speaker may need to increase volume or speak distinctly

## 2020-07-23 NOTE — ED PROVIDER NOTE - CARE PLAN
Principal Discharge DX:	Skin tear of lower leg without complication, left, initial encounter  Secondary Diagnosis:	HTN (hypertension)

## 2020-07-23 NOTE — ED ADULT NURSE REASSESSMENT NOTE - NS ED NURSE REASSESS COMMENT FT1
Patient ambulated with walker with standby assist with no complaints. Left leg irrigated and cleaned, and wrapped. Daughter notified per Patient request.

## 2020-07-23 NOTE — ED ADULT NURSE NOTE - OBJECTIVE STATEMENT
Patient comes to ED for walking to bathroom, hitting left shin with hand and bleeding noted. Patient denies any fall or injury. no head injury. patient is AxOx4. Patient lives home with aide. Patient unknown last tetanus shot. Patient denies any pain or discomfort. 2 skin tears noted to left shin.

## 2020-07-23 NOTE — ED ADULT NURSE NOTE - NSIMPLEMENTINTERV_GEN_ALL_ED
Implemented All Fall with Harm Risk Interventions:  [place] to call system. Call bell, personal items and telephone within reach. Instruct patient to call for assistance. Room bathroom lighting operational. Non-slip footwear when patient is off stretcher. Physically safe environment: no spills, clutter or unnecessary equipment. Stretcher in lowest position, wheels locked, appropriate side rails in place. Provide visual cue, wrist band, yellow gown, etc. Monitor gait and stability. Monitor for mental status changes and reorient to person, place, and time. Review medications for side effects contributing to fall risk. Reinforce activity limits and safety measures with patient and family. Provide visual clues: red socks.

## 2020-07-23 NOTE — ED PROVIDER NOTE - PATIENT PORTAL LINK FT
You can access the FollowMyHealth Patient Portal offered by [facility] by registering at the following website: [email or web address]. By joining [employer]’s FollowMyHealth portal, you will also be able to view your health information using other applications (apps) compatible with our system.

## 2020-07-31 ENCOUNTER — APPOINTMENT (OUTPATIENT)
Dept: DERMATOLOGY | Facility: CLINIC | Age: 85
End: 2020-07-31
Payer: MEDICARE

## 2020-07-31 VITALS — WEIGHT: 142 LBS | BODY MASS INDEX: 25.16 KG/M2 | HEIGHT: 63 IN

## 2020-07-31 DIAGNOSIS — L98.491 NON-PRESSURE CHRONIC ULCER OF SKIN OF OTHER SITES LIMITED TO BREAKDOWN OF SKIN: ICD-10-CM

## 2020-07-31 PROCEDURE — 99213 OFFICE O/P EST LOW 20 MIN: CPT

## 2020-07-31 RX ORDER — MUPIROCIN 20 MG/G
2 OINTMENT TOPICAL
Qty: 1 | Refills: 2 | Status: ACTIVE | COMMUNITY
Start: 2020-07-31 | End: 1900-01-01

## 2020-07-31 NOTE — HISTORY OF PRESENT ILLNESS
[de-identified] : Pt. for check of wound on L leg;  present over 1 week;  ? unknown trauma; \par using dressings at home

## 2020-07-31 NOTE — ASSESSMENT
[FreeTextEntry1] : Traumatic abrasions, exacerbated by atrophic changes; \par \par Therapeutic options and their risks and benefits; along with multiple diagnostic possibilities were discussed at length;\par \par d/w pt. and aide;\par vaseline/telfa/coban to both areas; \par Rx given for mupirocin to use at home; \par

## 2020-07-31 NOTE — PHYSICAL EXAM
[FreeTextEntry3] : L upper shin; \par shallow shear ulceration;  clean, no signs infection; \par \par similar, partially healed wound inferiorly

## 2021-06-15 NOTE — ED ADULT TRIAGE NOTE - ARRIVAL FROM
I would recommend trying to stay off the medication for 10 days to see if he notes and changes- if not at that time we should order MRI of the lumbar back to look for causes as we discussed - this would be an MRI  So please have him send me an update after being off the medication for 10 days.  Dr. Romero   Home

## 2021-11-16 ENCOUNTER — EMERGENCY (EMERGENCY)
Facility: HOSPITAL | Age: 86
LOS: 0 days | Discharge: HOME | End: 2021-11-16
Attending: EMERGENCY MEDICINE
Payer: MEDICARE

## 2021-11-16 VITALS
RESPIRATION RATE: 16 BRPM | OXYGEN SATURATION: 95 % | SYSTOLIC BLOOD PRESSURE: 192 MMHG | HEIGHT: 63 IN | WEIGHT: 145.06 LBS | TEMPERATURE: 98 F | DIASTOLIC BLOOD PRESSURE: 79 MMHG | HEART RATE: 70 BPM

## 2021-11-16 VITALS
TEMPERATURE: 98 F | DIASTOLIC BLOOD PRESSURE: 81 MMHG | HEART RATE: 71 BPM | SYSTOLIC BLOOD PRESSURE: 187 MMHG | OXYGEN SATURATION: 97 % | RESPIRATION RATE: 18 BRPM

## 2021-11-16 DIAGNOSIS — R07.89 OTHER CHEST PAIN: ICD-10-CM

## 2021-11-16 DIAGNOSIS — M25.511 PAIN IN RIGHT SHOULDER: ICD-10-CM
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
## 2021-11-16 DIAGNOSIS — S09.90XA UNSPECIFIED INJURY OF HEAD, INITIAL ENCOUNTER: ICD-10-CM

## 2021-11-16 DIAGNOSIS — Z79.82 LONG TERM (CURRENT) USE OF ASPIRIN: ICD-10-CM

## 2021-11-16 DIAGNOSIS — M25.551 PAIN IN RIGHT HIP: ICD-10-CM

## 2021-11-16 DIAGNOSIS — R26.2 DIFFICULTY IN WALKING, NOT ELSEWHERE CLASSIFIED: ICD-10-CM

## 2021-11-16 DIAGNOSIS — E78.5 HYPERLIPIDEMIA, UNSPECIFIED: ICD-10-CM
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
## 2021-11-16 DIAGNOSIS — N39.0 URINARY TRACT INFECTION, SITE NOT SPECIFIED: ICD-10-CM

## 2021-11-16 LAB
ANION GAP SERPL CALC-SCNC: 5 MMOL/L — SIGNIFICANT CHANGE UP (ref 5–17)
APPEARANCE UR: ABNORMAL
BASOPHILS # BLD AUTO: 0.07 K/UL — SIGNIFICANT CHANGE UP (ref 0–0.2)
BASOPHILS NFR BLD AUTO: 0.7 % — SIGNIFICANT CHANGE UP (ref 0–2)
BILIRUB UR-MCNC: NEGATIVE — SIGNIFICANT CHANGE UP
BUN SERPL-MCNC: 30 MG/DL — HIGH (ref 7–23)
CALCIUM SERPL-MCNC: 8.8 MG/DL — SIGNIFICANT CHANGE UP (ref 8.5–10.1)
CHLORIDE SERPL-SCNC: 112 MMOL/L — HIGH (ref 96–108)
CO2 SERPL-SCNC: 26 MMOL/L — SIGNIFICANT CHANGE UP (ref 22–31)
COLOR SPEC: YELLOW — SIGNIFICANT CHANGE UP
CREAT SERPL-MCNC: 1.11 MG/DL — SIGNIFICANT CHANGE UP (ref 0.5–1.3)
DIFF PNL FLD: ABNORMAL
EOSINOPHIL # BLD AUTO: 0.23 K/UL — SIGNIFICANT CHANGE UP (ref 0–0.5)
EOSINOPHIL NFR BLD AUTO: 2.2 % — SIGNIFICANT CHANGE UP (ref 0–6)
GLUCOSE SERPL-MCNC: 103 MG/DL — HIGH (ref 70–99)
GLUCOSE UR QL: NEGATIVE MG/DL — SIGNIFICANT CHANGE UP
HCT VFR BLD CALC: 44.6 % — SIGNIFICANT CHANGE UP (ref 34.5–45)
HGB BLD-MCNC: 14.5 G/DL — SIGNIFICANT CHANGE UP (ref 11.5–15.5)
IMM GRANULOCYTES NFR BLD AUTO: 0.5 % — SIGNIFICANT CHANGE UP (ref 0–1.5)
KETONES UR-MCNC: NEGATIVE — SIGNIFICANT CHANGE UP
LEUKOCYTE ESTERASE UR-ACNC: ABNORMAL
LYMPHOCYTES # BLD AUTO: 1.35 K/UL — SIGNIFICANT CHANGE UP (ref 1–3.3)
LYMPHOCYTES # BLD AUTO: 13.1 % — SIGNIFICANT CHANGE UP (ref 13–44)
MCHC RBC-ENTMCNC: 29.8 PG — SIGNIFICANT CHANGE UP (ref 27–34)
MCHC RBC-ENTMCNC: 32.5 GM/DL — SIGNIFICANT CHANGE UP (ref 32–36)
MCV RBC AUTO: 91.8 FL — SIGNIFICANT CHANGE UP (ref 80–100)
MONOCYTES # BLD AUTO: 0.95 K/UL — HIGH (ref 0–0.9)
MONOCYTES NFR BLD AUTO: 9.2 % — SIGNIFICANT CHANGE UP (ref 2–14)
NEUTROPHILS # BLD AUTO: 7.69 K/UL — HIGH (ref 1.8–7.4)
NEUTROPHILS NFR BLD AUTO: 74.3 % — SIGNIFICANT CHANGE UP (ref 43–77)
NITRITE UR-MCNC: NEGATIVE — SIGNIFICANT CHANGE UP
PH UR: 5 — SIGNIFICANT CHANGE UP (ref 5–8)
PLATELET # BLD AUTO: 185 K/UL — SIGNIFICANT CHANGE UP (ref 150–400)
POTASSIUM SERPL-MCNC: 4.4 MMOL/L — SIGNIFICANT CHANGE UP (ref 3.5–5.3)
POTASSIUM SERPL-SCNC: 4.4 MMOL/L — SIGNIFICANT CHANGE UP (ref 3.5–5.3)
PROT UR-MCNC: 100 MG/DL
RBC # BLD: 4.86 M/UL — SIGNIFICANT CHANGE UP (ref 3.8–5.2)
RBC # FLD: 14 % — SIGNIFICANT CHANGE UP (ref 10.3–14.5)
SODIUM SERPL-SCNC: 143 MMOL/L — SIGNIFICANT CHANGE UP (ref 135–145)
SP GR SPEC: 1.02 — SIGNIFICANT CHANGE UP (ref 1.01–1.02)
UROBILINOGEN FLD QL: NEGATIVE MG/DL — SIGNIFICANT CHANGE UP
WBC # BLD: 10.34 K/UL — SIGNIFICANT CHANGE UP (ref 3.8–10.5)
WBC # FLD AUTO: 10.34 K/UL — SIGNIFICANT CHANGE UP (ref 3.8–10.5)

## 2021-11-16 PROCEDURE — 87186 SC STD MICRODIL/AGAR DIL: CPT

## 2021-11-16 PROCEDURE — 71045 X-RAY EXAM CHEST 1 VIEW: CPT

## 2021-11-16 PROCEDURE — U0003: CPT

## 2021-11-16 PROCEDURE — 73552 X-RAY EXAM OF FEMUR 2/>: CPT | Mod: RT

## 2021-11-16 PROCEDURE — 73030 X-RAY EXAM OF SHOULDER: CPT | Mod: RT

## 2021-11-16 PROCEDURE — 71045 X-RAY EXAM CHEST 1 VIEW: CPT | Mod: 26

## 2021-11-16 PROCEDURE — 36415 COLL VENOUS BLD VENIPUNCTURE: CPT

## 2021-11-16 PROCEDURE — 99284 EMERGENCY DEPT VISIT MOD MDM: CPT | Mod: 25

## 2021-11-16 PROCEDURE — 73552 X-RAY EXAM OF FEMUR 2/>: CPT | Mod: 26,RT

## 2021-11-16 PROCEDURE — 73030 X-RAY EXAM OF SHOULDER: CPT | Mod: 26,RT

## 2021-11-16 PROCEDURE — 71260 CT THORAX DX C+: CPT | Mod: MA

## 2021-11-16 PROCEDURE — 73502 X-RAY EXAM HIP UNI 2-3 VIEWS: CPT | Mod: RT

## 2021-11-16 PROCEDURE — 74177 CT ABD & PELVIS W/CONTRAST: CPT | Mod: MA

## 2021-11-16 PROCEDURE — 80048 BASIC METABOLIC PNL TOTAL CA: CPT

## 2021-11-16 PROCEDURE — 81001 URINALYSIS AUTO W/SCOPE: CPT

## 2021-11-16 PROCEDURE — 85025 COMPLETE CBC W/AUTO DIFF WBC: CPT

## 2021-11-16 PROCEDURE — 74177 CT ABD & PELVIS W/CONTRAST: CPT | Mod: 26,MA

## 2021-11-16 PROCEDURE — 73502 X-RAY EXAM HIP UNI 2-3 VIEWS: CPT | Mod: 26,RT

## 2021-11-16 PROCEDURE — U0005: CPT

## 2021-11-16 PROCEDURE — 71260 CT THORAX DX C+: CPT | Mod: 26,MA

## 2021-11-16 PROCEDURE — 87086 URINE CULTURE/COLONY COUNT: CPT

## 2021-11-16 PROCEDURE — 99284 EMERGENCY DEPT VISIT MOD MDM: CPT

## 2021-11-16 RX ORDER — ACETAMINOPHEN 500 MG
1000 TABLET ORAL ONCE
Refills: 0 | Status: COMPLETED | OUTPATIENT
Start: 2021-11-16 | End: 2021-11-16

## 2021-11-16 RX ORDER — CEPHALEXIN 500 MG
500 CAPSULE ORAL ONCE
Refills: 0 | Status: COMPLETED | OUTPATIENT
Start: 2021-11-16 | End: 2021-11-16

## 2021-11-16 RX ORDER — CEPHALEXIN 500 MG
1 CAPSULE ORAL
Qty: 14 | Refills: 0
Start: 2021-11-16 | End: 2021-11-22

## 2021-11-16 RX ADMIN — Medication 500 MILLIGRAM(S): at 11:06

## 2021-11-16 RX ADMIN — Medication 1000 MILLIGRAM(S): at 06:42

## 2021-11-16 NOTE — ED PROVIDER NOTE - PATIENT PORTAL LINK FT
You can access the FollowMyHealth Patient Portal offered by [facility] by registering at the following website: [email or web address]. By joining [employer]’s FollowMyHealth portal, you will also be able to view your health information using other applications (apps) compatible with our system.

## 2021-11-16 NOTE — ED ADULT NURSE REASSESSMENT NOTE - NS ED NURSE REASSESS COMMENT FT1
ambulation trial done, pt walked well with walker, md zuniga made aware, iv removed, no s/sx of infection noted, dc instructions given to pt and son, both verbalized understanding, escorted pt on wc upon dc.

## 2021-11-16 NOTE — ED ADULT TRIAGE NOTE - CHIEF COMPLAINT QUOTE
patient brought in by EMS from home c/o right shoulder pain s/p mechanical fall.  patient was walking back to bathroom and fell, landing on right shoulder.  denies head strike, - LOC, on baby ASA.

## 2021-11-16 NOTE — ED ADULT NURSE NOTE - NSSUHOSCREENINGYN_ED_ALL_ED

## 2021-11-16 NOTE — ED ADULT NURSE NOTE - OBJECTIVE STATEMENT
patient brought in by EMS from home c/o right shoulder pain s/p mechanical fall. son at bedside. per son, patient was going to get out of bed and fell, landing on right shoulder. denies head strike, - LOC, on baby ASA. pt is awake and alert.

## 2021-11-16 NOTE — ED PROVIDER NOTE - PHYSICAL EXAMINATION
Constitutional: NAD AAOx3  Eyes: PERRLA EOMI  Head: Normocephalic atraumatic  ENT: No bermudez sign, no raccoon eyes, no csf rhinorrhea, no nasal septal hematoma  Mouth: MMM  Cardiac: regular rate   Resp: Lungs CTAB  GI: Abd s/nt/nd  Neuro: CN2-12 intact GCS 15  Skin: No rashes, no bruising to chest, back, abdomen or extremities  Msk: No midline spinal ttp, full ROM of neck, c-collar cleared clinically and with provocative testing, no ttp of facial bones, + ttp to right superior anterior chest wall, pelvis stable, + ttp right shoulder and right hip 107 Constitutional: NAD AAOx3  Eyes: PERRLA EOMI  Head: Normocephalic atraumatic  ENT: No bermudez sign, no raccoon eyes, no csf rhinorrhea, no nasal septal hematoma  Mouth: MMM  Cardiac: regular rate   Resp: Lungs CTAB  GI: Abd s/nt/nd  Neuro: CN2-12 intact GCS 15  Skin: No rashes, no bruising, back, abdomen or extremities, small amount of bruising to superior anterior chest wall on right in subclavian region  Msk: No midline spinal ttp, full ROM of neck, c-collar cleared clinically and with provocative testing, no ttp of facial bones, + ttp to right superior anterior chest wall, pelvis stable, + ttp right shoulder and right hip

## 2021-11-16 NOTE — ED PROVIDER NOTE - CLINICAL SUMMARY MEDICAL DECISION MAKING FREE TEXT BOX
89F hx hld previous breast CA presents to the ED with right sided pain. pt was getting out of bed to go to the bathroom. she normally walks with a walker or is in a wheelchair. didn't use either lost her balance and fell onto right side. didn't hit head no LOC. was on ground only for a minute because 24 hour aid was there and help her up. family at bedside confirms story. now complains of pain to the right chest wall, right shoulder and right hip. denies any symptoms prior to the fall. no cp sob ha dizziness urinary symptoms nausea or vomiting. pain mild to moderate worse with movement. exam with + ttp to right superior anterior chest wall, pelvis stable, + ttp right shoulder and right hip. mechanical fall. will obtain imaging pain control and reassess. Venkat Romano M.D., Attending Physician 89F hx hld previous breast CA presents to the ED with right sided pain. pt was getting out of bed to go to the bathroom. she normally walks with a walker or is in a wheelchair. didn't use either lost her balance and fell onto right side. didn't hit head no LOC. was on ground only for a minute because 24 hour aid was there and help her up. family at bedside confirms story. now complains of pain to the right chest wall, right shoulder and right hip. denies any symptoms prior to the fall. no cp sob ha dizziness urinary symptoms nausea or vomiting. pain mild to moderate worse with movement. exam with + ttp to right superior anterior chest wall, pelvis stable, + ttp right shoulder and right hip. small amount of bruising to superior anterior chest wall on right in subclavian region. mechanical fall. will obtain imaging pain control and reassess. Venkat Romano M.D., Attending Physician

## 2021-11-16 NOTE — ED PROVIDER NOTE - CARE PLAN
1 Principal Discharge DX:	CHI (closed head injury), initial encounter  Secondary Diagnosis:	Acute UTI

## 2021-11-16 NOTE — ED PROVIDER NOTE - OBJECTIVE STATEMENT
89F hx hld previous breast CA presents to the ED with right sided pain. pt was getting out of bed to go to the bathroom. she normally walks with a walker or is in a wheelchair. didn't use either lost her balance and fell onto right side. didn't hit head no LOC. was on ground only for a minute because 24 hour aid was there and help her up. family at bedside confirms story. now complains of pain to the right chest wall, right shoulder and right hip. denies any symptoms prior to the fall. no cp sob ha dizziness urinary symptoms nausea or vomiting. pain mild to moderate worse with movement.

## 2021-11-16 NOTE — ED PROVIDER NOTE - NS ED ROS FT
Constitutional: No fever or chills  Eyes: No visual changes  HEENT: No throat pain  CV: No chest pain  Resp: No SOB no cough  GI: No abd pain, nausea or vomiting  : No dysuria  MSK: + right shoulder pain right hip pain right chest wall pain  Skin: No rash  Neuro: No headache

## 2021-11-16 NOTE — ED PROVIDER NOTE - PROGRESS NOTE DETAILS
pt without any signs of traumatic injury. pt ambulatory at baseline. incidental findings communicated with patient and family pt with uti. spoke with patient and family at length comfortable going home. will dc with follow up and strict return precautions. Venkat Romano M.D., Attending Physician

## 2021-12-28 NOTE — DISCHARGE NOTE ADULT - MEDICATION SUMMARY - MEDICATIONS TO TAKE
I will START or STAY ON the medications listed below when I get home from the hospital:    predniSONE 10 mg oral tablet  -- 2 tab(s) by mouth once a day  for 1 day, then 1 tab by mouth once a day for 2 days then stop  -- It is very important that you take or use this exactly as directed.  Do not skip doses or discontinue unless directed by your doctor.  Obtain medical advice before taking any non-prescription drugs as some may affect the action of this medication.  Take with food or milk.    -- Indication: For Bronchitis    aspirin 81 mg oral tablet, chewable  -- 1 tab(s) by mouth once a day  -- Indication: For home med    Zocor 20 mg oral tablet  -- 1 tab(s) by mouth once a day (at bedtime)  -- Indication: For home med    benzonatate 100 mg oral capsule  -- 1 cap(s) by mouth 3 times a day, As needed, Cough  -- Indication: For Cough    amLODIPine 5 mg oral tablet  -- 1 tab(s) by mouth once a day  -- Indication: For hypertension    Ceftin 250 mg oral tablet  -- 1 tab(s) by mouth every 12 hours   -- Finish all this medication unless otherwise directed by prescriber.  Medication should be taken with plenty of water.  Take with food or milk.    -- Indication: For Pneumonia     guaiFENesin 600 mg oral tablet, extended release  -- 1 tab(s) by mouth every 12 hours  -- Indication: For Cough    ferrous sulfate 325 mg (65 mg elemental iron) oral tablet  -- 1 tab(s) by mouth 2 times a day  -- Indication: For anemia    docusate sodium 100 mg oral capsule  -- 1 cap(s) by mouth 2 times a day, As needed, Constipation  -- Indication: For Constipation    polyethylene glycol 3350 oral powder for reconstitution  -- 17 gram(s) by mouth once a day, As needed, Constipation  -- Indication: For Constipation    pilocarpine 4% ophthalmic solution  -- 1 dose(s) to each affected eye 2 times a day  -- Indication: For home med    dorzolamide-timolol 2.23%-0.68% ophthalmic solution  -- 1 drop(s) to each affected eye 2 times a day  -- Indication: For home med    latanoprost 0.005% ophthalmic solution  -- 1 drop(s) to each affected eye once a day (in the evening)  -- Indication: For home med    B-12 1000 mcg sublingual tablet  -- 1 tab(s) sublingually once a day   -- Do not chew, break, or crush.    -- Indication: For anemia/b12 deficiency
28-Dec-2021 16:15

## 2022-04-06 NOTE — ED PROVIDER NOTE - NSFOLLOWUPINSTRUCTIONS_ED_ALL_ED_FT
1. return for worsening symptoms or anything concerning to you  2. take all home meds as prescribed  3. follow up with your pmd call to make an appointment  4. take keflex as directed    Urinary Tract Infection, Adult  ImageA urinary tract infection (UTI) is an infection of any part of the urinary tract, which includes the kidneys, ureters, bladder, and urethra. These organs make, store, and get rid of urine in the body. UTI can be a bladder infection (cystitis) or kidney infection (pyelonephritis).    What are the causes?  This infection may be caused by fungi, viruses, or bacteria. Bacteria are the most common cause of UTIs. This condition can also be caused by repeated incomplete emptying of the bladder during urination.    What increases the risk?  This condition is more likely to develop if:    You ignore your need to urinate or hold urine for long periods of time.  You do not empty your bladder completely during urination.  You wipe back to front after urinating or having a bowel movement, if you are female.  You are uncircumcised, if you are male.  You are constipated.  You have a urinary catheter that stays in place (indwelling).  You have a weak defense (immune) system.  You have a medical condition that affects your bowels, kidneys, or bladder.  You have diabetes.  You take antibiotic medicines frequently or for long periods of time, and the antibiotics no longer work well against certain types of infections (antibiotic resistance).  You take medicines that irritate your urinary tract.  You are exposed to chemicals that irritate your urinary tract.  You are female.    What are the signs or symptoms?  Symptoms of this condition include:    Fever.  Frequent urination or passing small amounts of urine frequently.  Needing to urinate urgently.  Pain or burning with urination.  Urine that smells bad or unusual.  Cloudy urine.  Pain in the lower abdomen or back.  Trouble urinating.  Blood in the urine.  Vomiting or being less hungry than normal.  Diarrhea or abdominal pain.  Vaginal discharge, if you are female.    How is this diagnosed?  This condition is diagnosed with a medical history and physical exam. You will also need to provide a urine sample to test your urine. Other tests may be done, including:    Blood tests.  Sexually transmitted disease (STD) testing.    If you have had more than one UTI, a cystoscopy or imaging studies may be done to determine the cause of the infections.    How is this treated?  Treatment for this condition often includes a combination of two or more of the following:    Antibiotic medicine.  Other medicines to treat less common causes of UTI.  Over-the-counter medicines to treat pain.  Drinking enough water to stay hydrated.    Follow these instructions at home:  Take over-the-counter and prescription medicines only as told by your health care provider.  If you were prescribed an antibiotic, take it as told by your health care provider. Do not stop taking the antibiotic even if you start to feel better.  Avoid alcohol, caffeine, tea, and carbonated beverages. They can irritate your bladder.  Drink enough fluid to keep your urine clear or pale yellow.  Keep all follow-up visits as told by your health care provider. This is important.  ImageMake sure to:    Empty your bladder often and completely. Do not hold urine for long periods of time.  Empty your bladder before and after sex.  Wipe from front to back after a bowel movement if you are female. Use each tissue one time when you wipe.    Contact a health care provider if:  You have back pain.  You have a fever.  You feel nauseous or vomit.  Your symptoms do not get better after 3 days.  Your symptoms go away and then return.  Get help right away if:  You have severe back pain or lower abdominal pain.  You are vomiting and cannot keep down any medicines or water.  This information is not intended to replace advice given to you by your health care provider. Make sure you discuss any questions you have with your health care provider.    Head Injury    WHAT YOU NEED TO KNOW:    A head injury is most often caused by a blow to the head. This may occur from a fall, bicycle injury, sports injury, being struck in the head, or a motor vehicle accident.     DISCHARGE INSTRUCTIONS:    Call 911 or have someone else call for any of the following:     You cannot be woken.      You have a seizure.      You stop responding to others or you faint.      You have blurry or double vision.      Your speech becomes slurred or confused.      You have arm or leg weakness, loss of feeling, or new problems with coordination.      Your pupils are larger than usual or one pupil is a different size than the other.       You have blood or clear fluid coming out of your ears or nose.    Return to the emergency department if:     You have repeated or forceful vomiting.      You feel confused.      Your headache gets worse or becomes severe.      You or someone caring for you notices that you are harder to wake than usual.    Contact your healthcare provider if:     Your symptoms last longer than 6 weeks after the injury.      You have questions or concerns about your condition or care.    Medicines:     Acetaminophen decreases pain. Acetaminophen is available without a doctor's order. Ask how much to take and how often to take it. Follow directions. Acetaminophen can cause liver damage if not taken correctly.      Take your medicine as directed. Contact your healthcare provider if you think your medicine is not helping or if you have side effects. Tell him or her if you are allergic to any medicine. Keep a list of the medicines, vitamins, and herbs you take. Include the amounts, and when and why you take them. Bring the list or the pill bottles to follow-up visits. Carry your medicine list with you in case of an emergency.    Self-care:     Rest or do quiet activities for 24 to 48 hours. Limit your time watching TV, using the computer, or doing tasks that require a lot of thinking. Slowly return to your normal activities as directed. Do not play sports or do activities that may cause you to get hit in the head. Ask your healthcare provider when you can return to sports.       Apply ice on your head for 15 to 20 minutes every hour or as directed. Use an ice pack, or put crushed ice in a plastic bag. Cover it with a towel before you apply it to your skin. Ice helps prevent tissue damage and decreases swelling and pain.       Have someone stay with you for 24 hours or as directed. This person can monitor you for complications and call 911. When you are awake the person should ask you a few questions to see if you are thinking clearly. An example would be to ask your name or your address.     Prevent another head injury:     Wear a helmet that fits properly. Do this when you play sports, or ride a bike, scooter, or skateboard. Helmets help decrease your risk of a serious head injury. Talk to your healthcare provider about other ways you can protect yourself if you play sports.      Wear your seat belt every time you are in a car. This helps to decrease your risk for a head injury if you are in a car accident.     Follow up with your healthcare provider as directed: Write down your questions so you remember to ask them during your visits. Detail Level: Detailed

## 2022-04-24 ENCOUNTER — INPATIENT (INPATIENT)
Facility: HOSPITAL | Age: 87
LOS: 3 days | Discharge: HOSPICE | DRG: 444 | End: 2022-04-28
Attending: HOSPITALIST | Admitting: FAMILY MEDICINE
Payer: MEDICARE

## 2022-04-24 VITALS
OXYGEN SATURATION: 100 % | HEIGHT: 63 IN | RESPIRATION RATE: 18 BRPM | WEIGHT: 130.07 LBS | DIASTOLIC BLOOD PRESSURE: 75 MMHG | SYSTOLIC BLOOD PRESSURE: 198 MMHG | HEART RATE: 88 BPM | TEMPERATURE: 99 F

## 2022-04-24 DIAGNOSIS — K81.9 CHOLECYSTITIS, UNSPECIFIED: ICD-10-CM

## 2022-04-24 LAB
ADD ON TEST-SPECIMEN IN LAB: SIGNIFICANT CHANGE UP
ALBUMIN SERPL ELPH-MCNC: 3.1 G/DL — LOW (ref 3.3–5)
ALP SERPL-CCNC: 71 U/L — SIGNIFICANT CHANGE UP (ref 40–120)
ALT FLD-CCNC: 17 U/L — SIGNIFICANT CHANGE UP (ref 12–78)
ANION GAP SERPL CALC-SCNC: 8 MMOL/L — SIGNIFICANT CHANGE UP (ref 5–17)
APPEARANCE UR: ABNORMAL
APTT BLD: 28.4 SEC — SIGNIFICANT CHANGE UP (ref 27.5–35.5)
AST SERPL-CCNC: 17 U/L — SIGNIFICANT CHANGE UP (ref 15–37)
BASOPHILS # BLD AUTO: 0.06 K/UL — SIGNIFICANT CHANGE UP (ref 0–0.2)
BASOPHILS NFR BLD AUTO: 0.6 % — SIGNIFICANT CHANGE UP (ref 0–2)
BILIRUB SERPL-MCNC: 0.5 MG/DL — SIGNIFICANT CHANGE UP (ref 0.2–1.2)
BILIRUB UR-MCNC: NEGATIVE — SIGNIFICANT CHANGE UP
BUN SERPL-MCNC: 24 MG/DL — HIGH (ref 7–23)
CALCIUM SERPL-MCNC: 9 MG/DL — SIGNIFICANT CHANGE UP (ref 8.5–10.1)
CHLORIDE SERPL-SCNC: 111 MMOL/L — HIGH (ref 96–108)
CO2 SERPL-SCNC: 23 MMOL/L — SIGNIFICANT CHANGE UP (ref 22–31)
COLOR SPEC: YELLOW — SIGNIFICANT CHANGE UP
CREAT SERPL-MCNC: 1 MG/DL — SIGNIFICANT CHANGE UP (ref 0.5–1.3)
DIFF PNL FLD: ABNORMAL
EGFR: 51 ML/MIN/1.73M2 — LOW
EOSINOPHIL # BLD AUTO: 0.16 K/UL — SIGNIFICANT CHANGE UP (ref 0–0.5)
EOSINOPHIL NFR BLD AUTO: 1.5 % — SIGNIFICANT CHANGE UP (ref 0–6)
GLUCOSE SERPL-MCNC: 86 MG/DL — SIGNIFICANT CHANGE UP (ref 70–99)
GLUCOSE UR QL: NEGATIVE — SIGNIFICANT CHANGE UP
HCT VFR BLD CALC: 46.6 % — HIGH (ref 34.5–45)
HGB BLD-MCNC: 14.5 G/DL — SIGNIFICANT CHANGE UP (ref 11.5–15.5)
IMM GRANULOCYTES NFR BLD AUTO: 0.4 % — SIGNIFICANT CHANGE UP (ref 0–1.5)
INR BLD: 1.08 RATIO — SIGNIFICANT CHANGE UP (ref 0.88–1.16)
KETONES UR-MCNC: NEGATIVE — SIGNIFICANT CHANGE UP
LEUKOCYTE ESTERASE UR-ACNC: ABNORMAL
LIDOCAIN IGE QN: 54 U/L — LOW (ref 73–393)
LYMPHOCYTES # BLD AUTO: 1.87 K/UL — SIGNIFICANT CHANGE UP (ref 1–3.3)
LYMPHOCYTES # BLD AUTO: 17.2 % — SIGNIFICANT CHANGE UP (ref 13–44)
MCHC RBC-ENTMCNC: 29.4 PG — SIGNIFICANT CHANGE UP (ref 27–34)
MCHC RBC-ENTMCNC: 31.1 GM/DL — LOW (ref 32–36)
MCV RBC AUTO: 94.5 FL — SIGNIFICANT CHANGE UP (ref 80–100)
MONOCYTES # BLD AUTO: 1.09 K/UL — HIGH (ref 0–0.9)
MONOCYTES NFR BLD AUTO: 10 % — SIGNIFICANT CHANGE UP (ref 2–14)
NEUTROPHILS # BLD AUTO: 7.64 K/UL — HIGH (ref 1.8–7.4)
NEUTROPHILS NFR BLD AUTO: 70.3 % — SIGNIFICANT CHANGE UP (ref 43–77)
NITRITE UR-MCNC: NEGATIVE — SIGNIFICANT CHANGE UP
PH UR: 8 — SIGNIFICANT CHANGE UP (ref 5–8)
PLATELET # BLD AUTO: 190 K/UL — SIGNIFICANT CHANGE UP (ref 150–400)
POTASSIUM SERPL-MCNC: 4.3 MMOL/L — SIGNIFICANT CHANGE UP (ref 3.5–5.3)
POTASSIUM SERPL-SCNC: 4.3 MMOL/L — SIGNIFICANT CHANGE UP (ref 3.5–5.3)
PROT SERPL-MCNC: 7.1 GM/DL — SIGNIFICANT CHANGE UP (ref 6–8.3)
PROT UR-MCNC: 100
PROTHROM AB SERPL-ACNC: 12.5 SEC — SIGNIFICANT CHANGE UP (ref 10.5–13.4)
RBC # BLD: 4.93 M/UL — SIGNIFICANT CHANGE UP (ref 3.8–5.2)
RBC # FLD: 14 % — SIGNIFICANT CHANGE UP (ref 10.3–14.5)
SODIUM SERPL-SCNC: 142 MMOL/L — SIGNIFICANT CHANGE UP (ref 135–145)
SP GR SPEC: 1.01 — SIGNIFICANT CHANGE UP (ref 1.01–1.02)
UROBILINOGEN FLD QL: NEGATIVE — SIGNIFICANT CHANGE UP
WBC # BLD: 10.86 K/UL — HIGH (ref 3.8–10.5)
WBC # FLD AUTO: 10.86 K/UL — HIGH (ref 3.8–10.5)

## 2022-04-24 PROCEDURE — 74177 CT ABD & PELVIS W/CONTRAST: CPT | Mod: 26,MA

## 2022-04-24 PROCEDURE — 93010 ELECTROCARDIOGRAM REPORT: CPT

## 2022-04-24 PROCEDURE — 84100 ASSAY OF PHOSPHORUS: CPT

## 2022-04-24 PROCEDURE — 83735 ASSAY OF MAGNESIUM: CPT

## 2022-04-24 PROCEDURE — 97162 PT EVAL MOD COMPLEX 30 MIN: CPT | Mod: GP

## 2022-04-24 PROCEDURE — 76705 ECHO EXAM OF ABDOMEN: CPT | Mod: 26

## 2022-04-24 PROCEDURE — 80053 COMPREHEN METABOLIC PANEL: CPT

## 2022-04-24 PROCEDURE — 85025 COMPLETE CBC W/AUTO DIFF WBC: CPT

## 2022-04-24 PROCEDURE — 99285 EMERGENCY DEPT VISIT HI MDM: CPT

## 2022-04-24 PROCEDURE — 97530 THERAPEUTIC ACTIVITIES: CPT | Mod: GP

## 2022-04-24 PROCEDURE — 99223 1ST HOSP IP/OBS HIGH 75: CPT

## 2022-04-24 PROCEDURE — 97116 GAIT TRAINING THERAPY: CPT | Mod: GP

## 2022-04-24 PROCEDURE — 84443 ASSAY THYROID STIM HORMONE: CPT

## 2022-04-24 PROCEDURE — 36415 COLL VENOUS BLD VENIPUNCTURE: CPT

## 2022-04-24 RX ORDER — FOLIC ACID 0.8 MG
1 TABLET ORAL DAILY
Refills: 0 | Status: DISCONTINUED | OUTPATIENT
Start: 2022-04-24 | End: 2022-04-28

## 2022-04-24 RX ORDER — LANOLIN ALCOHOL/MO/W.PET/CERES
2 CREAM (GRAM) TOPICAL
Qty: 0 | Refills: 0 | DISCHARGE

## 2022-04-24 RX ORDER — LANOLIN ALCOHOL/MO/W.PET/CERES
5 CREAM (GRAM) TOPICAL AT BEDTIME
Refills: 0 | Status: DISCONTINUED | OUTPATIENT
Start: 2022-04-24 | End: 2022-04-28

## 2022-04-24 RX ORDER — SIMVASTATIN 20 MG/1
20 TABLET, FILM COATED ORAL AT BEDTIME
Refills: 0 | Status: DISCONTINUED | OUTPATIENT
Start: 2022-04-24 | End: 2022-04-28

## 2022-04-24 RX ORDER — AMLODIPINE BESYLATE 2.5 MG/1
5 TABLET ORAL DAILY
Refills: 0 | Status: DISCONTINUED | OUTPATIENT
Start: 2022-04-24 | End: 2022-04-28

## 2022-04-24 RX ORDER — METOPROLOL TARTRATE 50 MG
12.5 TABLET ORAL
Qty: 0 | Refills: 0 | DISCHARGE

## 2022-04-24 RX ORDER — PILOCARPINE HCL 4 %
1 DROPS OPHTHALMIC (EYE)
Refills: 0 | Status: DISCONTINUED | OUTPATIENT
Start: 2022-04-24 | End: 2022-04-28

## 2022-04-24 RX ORDER — CX-024414 0.1 MG/ML
0 INJECTION, SUSPENSION INTRAMUSCULAR
Qty: 0 | Refills: 0 | DISCHARGE

## 2022-04-24 RX ORDER — PREGABALIN 225 MG/1
1000 CAPSULE ORAL DAILY
Refills: 0 | Status: DISCONTINUED | OUTPATIENT
Start: 2022-04-24 | End: 2022-04-28

## 2022-04-24 RX ORDER — ACETAMINOPHEN 500 MG
1 TABLET ORAL
Qty: 0 | Refills: 0 | DISCHARGE

## 2022-04-24 RX ORDER — DORZOLAMIDE HYDROCHLORIDE TIMOLOL MALEATE 20; 5 MG/ML; MG/ML
1 SOLUTION/ DROPS OPHTHALMIC
Refills: 0 | Status: DISCONTINUED | OUTPATIENT
Start: 2022-04-24 | End: 2022-04-28

## 2022-04-24 RX ORDER — METOPROLOL TARTRATE 50 MG
2.5 TABLET ORAL ONCE
Refills: 0 | Status: COMPLETED | OUTPATIENT
Start: 2022-04-24 | End: 2022-04-24

## 2022-04-24 RX ORDER — SIMVASTATIN 20 MG/1
1 TABLET, FILM COATED ORAL
Qty: 0 | Refills: 0 | DISCHARGE

## 2022-04-24 RX ORDER — PREGABALIN 225 MG/1
1 CAPSULE ORAL
Qty: 0 | Refills: 0 | DISCHARGE

## 2022-04-24 RX ORDER — FUROSEMIDE 40 MG
20 TABLET ORAL ONCE
Refills: 0 | Status: COMPLETED | OUTPATIENT
Start: 2022-04-24 | End: 2022-04-24

## 2022-04-24 RX ORDER — MIRTAZAPINE 45 MG/1
0 TABLET, ORALLY DISINTEGRATING ORAL
Qty: 0 | Refills: 0 | DISCHARGE

## 2022-04-24 RX ORDER — SIMVASTATIN 20 MG/1
0 TABLET, FILM COATED ORAL
Qty: 0 | Refills: 0 | DISCHARGE

## 2022-04-24 RX ORDER — FOLIC ACID 0.8 MG
1 TABLET ORAL
Qty: 0 | Refills: 0 | DISCHARGE

## 2022-04-24 RX ORDER — METOPROLOL TARTRATE 50 MG
25 TABLET ORAL DAILY
Refills: 0 | Status: DISCONTINUED | OUTPATIENT
Start: 2022-04-24 | End: 2022-04-24

## 2022-04-24 RX ORDER — METOPROLOL TARTRATE 50 MG
2.5 TABLET ORAL ONCE
Refills: 0 | Status: DISCONTINUED | OUTPATIENT
Start: 2022-04-24 | End: 2022-04-24

## 2022-04-24 RX ORDER — ACETAMINOPHEN 500 MG
2 TABLET ORAL
Qty: 0 | Refills: 0 | DISCHARGE

## 2022-04-24 RX ORDER — PIPERACILLIN AND TAZOBACTAM 4; .5 G/20ML; G/20ML
3.38 INJECTION, POWDER, LYOPHILIZED, FOR SOLUTION INTRAVENOUS EVERY 8 HOURS
Refills: 0 | Status: DISCONTINUED | OUTPATIENT
Start: 2022-04-24 | End: 2022-04-28

## 2022-04-24 RX ORDER — SODIUM CHLORIDE 9 MG/ML
250 INJECTION INTRAMUSCULAR; INTRAVENOUS; SUBCUTANEOUS ONCE
Refills: 0 | Status: COMPLETED | OUTPATIENT
Start: 2022-04-24 | End: 2022-04-24

## 2022-04-24 RX ORDER — ASPIRIN/CALCIUM CARB/MAGNESIUM 324 MG
1 TABLET ORAL
Qty: 0 | Refills: 0 | DISCHARGE

## 2022-04-24 RX ORDER — PIPERACILLIN AND TAZOBACTAM 4; .5 G/20ML; G/20ML
3.38 INJECTION, POWDER, LYOPHILIZED, FOR SOLUTION INTRAVENOUS ONCE
Refills: 0 | Status: COMPLETED | OUTPATIENT
Start: 2022-04-24 | End: 2022-04-24

## 2022-04-24 RX ORDER — LATANOPROST 0.05 MG/ML
1 SOLUTION/ DROPS OPHTHALMIC; TOPICAL AT BEDTIME
Refills: 0 | Status: DISCONTINUED | OUTPATIENT
Start: 2022-04-24 | End: 2022-04-28

## 2022-04-24 RX ORDER — HEPARIN SODIUM 5000 [USP'U]/ML
5000 INJECTION INTRAVENOUS; SUBCUTANEOUS EVERY 8 HOURS
Refills: 0 | Status: DISCONTINUED | OUTPATIENT
Start: 2022-04-24 | End: 2022-04-28

## 2022-04-24 RX ADMIN — AMLODIPINE BESYLATE 5 MILLIGRAM(S): 2.5 TABLET ORAL at 23:18

## 2022-04-24 RX ADMIN — Medication 5 MILLIGRAM(S): at 23:15

## 2022-04-24 RX ADMIN — LATANOPROST 1 DROP(S): 0.05 SOLUTION/ DROPS OPHTHALMIC; TOPICAL at 23:16

## 2022-04-24 RX ADMIN — HEPARIN SODIUM 5000 UNIT(S): 5000 INJECTION INTRAVENOUS; SUBCUTANEOUS at 23:17

## 2022-04-24 RX ADMIN — Medication 2.5 MILLIGRAM(S): at 10:45

## 2022-04-24 RX ADMIN — Medication 1 DROP(S): at 23:16

## 2022-04-24 RX ADMIN — Medication 20 MILLIGRAM(S): at 16:19

## 2022-04-24 RX ADMIN — SODIUM CHLORIDE 250 MILLILITER(S): 9 INJECTION INTRAMUSCULAR; INTRAVENOUS; SUBCUTANEOUS at 09:14

## 2022-04-24 RX ADMIN — SIMVASTATIN 20 MILLIGRAM(S): 20 TABLET, FILM COATED ORAL at 23:17

## 2022-04-24 RX ADMIN — Medication 25 MILLIGRAM(S): at 16:20

## 2022-04-24 RX ADMIN — DORZOLAMIDE HYDROCHLORIDE TIMOLOL MALEATE 1 DROP(S): 20; 5 SOLUTION/ DROPS OPHTHALMIC at 23:16

## 2022-04-24 RX ADMIN — PIPERACILLIN AND TAZOBACTAM 25 GRAM(S): 4; .5 INJECTION, POWDER, LYOPHILIZED, FOR SOLUTION INTRAVENOUS at 23:17

## 2022-04-24 RX ADMIN — Medication 1 MILLIGRAM(S): at 16:20

## 2022-04-24 RX ADMIN — PIPERACILLIN AND TAZOBACTAM 200 GRAM(S): 4; .5 INJECTION, POWDER, LYOPHILIZED, FOR SOLUTION INTRAVENOUS at 11:13

## 2022-04-24 NOTE — ED PROVIDER NOTE - CLINICAL SUMMARY MEDICAL DECISION MAKING FREE TEXT BOX
elderly f with upper abdominal pain, concern for cholecystitis, gastritis less likely, less likely acs. labs, sono, ct, ekg, reassess

## 2022-04-24 NOTE — ED PROVIDER NOTE - OBJECTIVE STATEMENT
89 yo f presents to th e ed with abdominal pain since monday.  pain is in upper abdomen, no precipitating, exacerbating or alleviating factors.  She has been eating but kess than usual, no vomiting or diarrhea.

## 2022-04-24 NOTE — CONSULT NOTE ADULT - ASSESSMENT
A 89 year old female with abdominal pain and dilated CBD with duodenal diverticulum  ? Cholecystitis, no evidence of pericholecystic fluid   Patient will need to be worked up for the dilated bile ducts before consideration  of surgical intervention    Plan:  Recommend admission to medicine   GI consult  NPO  IV fluids  Analgesia  IV antibiotics  Surgery will follow  MRCP vs ERCP    Plan discussed with Dr Mosqueda

## 2022-04-24 NOTE — H&P ADULT - NSHPPHYSICALEXAM_GEN_ALL_CORE
Physical Exam: Vital Signs Last 24 Hrs  T(C): 36.7 (24 Apr 2022 17:34), Max: 37.1 (24 Apr 2022 06:43)  T(F): 98 (24 Apr 2022 17:34), Max: 98.8 (24 Apr 2022 06:43)  HR: 69 (24 Apr 2022 17:34) (69 - 88)  BP: 158/97 (24 Apr 2022 18:17) (158/97 - 198/75)  BP(mean): 108 (24 Apr 2022 15:56) (108 - 108)  RR: 18 (24 Apr 2022 17:34) (18 - 18)  SpO2: 97% (24 Apr 2022 17:34) (92% - 100%)        HEENT: PRRL EOMI    MOUTH/TEETH/GUMS: Clear    NECK: no JVD    LUNGS: Clear    HEART: S1,S2 RR    ABDOMEN: soft tender RUQ, BS present     EXTREMITIES:  no pedal edema    MUSCULOSKELETAL: no joint swelling     NEURO: no tremor, no focal signs.    SKIN: no rash    : CVA negative,

## 2022-04-24 NOTE — H&P ADULT - HISTORY OF PRESENT ILLNESS
HPI: The patient is a 89 yo female wiht Hx. of HTN, [place],  Low back pain, breast CA,[place], mild dementia  who presented in the ED for sever RUQ pain for the last 3 days. The patient has a private aid  who helps with the Hx. The patient daughter also arrived from [place]. The patient also had frequent PVC's and 2.8 second pause in heart monitor while in the ED.  Abd US showed cholecystitis, and the patient had surgical consult in the ED. The patient had a DNR at home and the patient daughter who is her HCP reaffirmed the DNR /DNI, gave verbal consent. She also does not want to have a PPM.     PMHx: HTN, Lumbago, Breast CA, [place],     PSHx: R mastectomy, colon CA, back and hip surgeries.    Family Hx: not obtainable secondary to dementia.    Social Hx.: not smoking, no alcohol use

## 2022-04-24 NOTE — ED ADULT TRIAGE NOTE - CHIEF COMPLAINT QUOTE
pt BIBEMS c/o right sided abdominal pain. pt states she has had the pain for the last three days. pt alert and oriented in triage. no other complaints at this time. denies headache, chest pain, changes in vision, SOB. pt comes from home with home health aid at bedside.

## 2022-04-24 NOTE — ED ADULT NURSE NOTE - OBJECTIVE STATEMENT
pt. presents from home with RUQ abdominal pain x1 week. now pain improved, no medications pta. no nausea, vomiting, diarrhea, SOB, CP, fevers or chills.

## 2022-04-24 NOTE — ED ADULT NURSE NOTE - CADM POA PRESS ULCER
01/19/18 1747   Clinical Encounter Type   Visited With Patient and family together   Routine Visit Introduction   Continue Visiting No   Patient Spiritual Encounters   Spiritual Interventions  provided emotional support and blessing No

## 2022-04-24 NOTE — PATIENT PROFILE ADULT - FALL HARM RISK - HARM RISK INTERVENTIONS

## 2022-04-24 NOTE — CONSULT NOTE ADULT - SUBJECTIVE AND OBJECTIVE BOX
A 89 year old female with dementia who was seen in the ED for abdominal pain that started almost a week ago. History was provided from the her aide who was at the bedside. Pain is felt in the right side of the abdomen and radiates to the back. She denies any nausea, vomiting, fever, chest pain, diarrhea, constipation, change in color of urine or stool     O/E: Afebrile, stable V/S  Alert, conscious, oriented to person only  Not in pain or distress  Abdomen: Soft, not distended, no tenderness. No masses  Extremities: Normal                          14.5   10.86 )-----------( 190      ( 24 Apr 2022 09:07 )             46.6   04-24    142  |  111<H>  |  24<H>  ----------------------------<  86  4.3   |  23  |  1.00    Ca    9.0      24 Apr 2022 09:07  Mg     2.2     04-24    TPro  7.1  /  Alb  3.1<L>  /  TBili  0.5  /  DBili  x   /  AST  17  /  ALT  17  /  AlkPhos  71  04-24  < from: CT Abdomen and Pelvis w/ IV Cont (04.24.22 @ 11:28) >  IMPRESSION:  1.  No acute abdominal/pelvic pathology or primary etiology of pain.  2.  Unchanged intra-/extra hepatic biliary duct dilatation to the level   of a periampullary duodenal diverticulum.  3.  Eccentric urinary bladder mural thickening and mucosal hyperemia,   unchanged. Correlation with urinalysis recommended      < end of copied text >  < from: US Abdomen Upper Quadrant Right (04.24.22 @ 08:50) >  INTERPRETATION:  CLINICAL INFORMATION: Right-sided abdominal pain.    COMPARISON: CT 11/16/2021    TECHNIQUE: Sonography of the right upper quadrant.    FINDINGS:    Limited examination with poor visualization of the liver.    Liver: No gross focal lesion.  Bile ducts: Intrahepatic and extrahepatic biliary ductal dilatation with   the CBD measuring up to 15 mm.  Gallbladder: Distended with sludge and stones. Concern for focal   tenderness/positive sonographic Romero sign.  Pancreas: Not visualized.  Right kidney: 9.2 cm. No hydronephrosis.  Ascites: None.  IVC: Not visualized.    IMPRESSION:  Distended gallbladder with stones and sludge with concern for positive   sonographic Romero sign/acute cholecystitis.    Intra and extrahepatic biliary ductal dilatation. Correlation with   contrast-enhanced MRI/MRCP is recommended.      < end of copied text >

## 2022-04-24 NOTE — ED PROVIDER NOTE - PROGRESS NOTE DETAILS
surgery paged in the or will call back Mirna Pantoja for attending Dr. Lao: Pt having pauses on EKG up to 2.8 seconds and intermittent bigeminy. Consulted Dr. Vargas, says admit to tele and nothing to do acutely as we do not worry about pauses less than 3 seconds.

## 2022-04-24 NOTE — H&P ADULT - ASSESSMENT
89 yo female wiht Hx. of HTN, [place],  Low back pain, breast CA,[place], mild dementia  who presented in the ED for sever RUQ pain for the last 3 days. The patient has a private aid  who helps with the Hx. The patient daughter also arrived from [place]. The patient also had frequent PVC's and 2.8 second pause in heart monitor while in the ED.  Abd US showed cholecystitis, and the patient had surgical consult in the ED. The patient had a DNR at home and the patient daughter who is her HCP reaffirmed the DNR /DNI, gave verbal consent. She also does not want to have a PPM.   assessment Dx:                       1. acute cholecystitis                       2. Bradycardia                       3. HTN                        4. UTI     Plan:    admit to Telemetry                medications: start Amlodipine for HTN, D/C Metoprolol, Started on Zosyn will continue q8h.               VTEP: Heparin                Labs: cbc bmp               Radiology:               Cardiac diagnostics:                Consults: Surgery , ID , Cardiology                 Advance Directive: DNR , DNI.

## 2022-04-24 NOTE — ED ADULT NURSE REASSESSMENT NOTE - NS ED NURSE REASSESS COMMENT FT1
pt. noted hypertensive and hypoxic to 90%RA. MD Eden Made aware, IVP LAsix 20mg ordered, okay to give Metoprolol now.

## 2022-04-24 NOTE — ED ADULT NURSE REASSESSMENT NOTE - NS ED NURSE REASSESS COMMENT FT1
Pt resting comfortably in bed with no acute distress noted. Pt and aide updated on their status, the current plan of care, and available results no needs or requests at this time. Call bell within reach. Will continue to monitor/reassess.

## 2022-04-24 NOTE — ED ADULT NURSE NOTE - NSIMPLEMENTINTERV_GEN_ALL_ED
Implemented All Universal Safety Interventions:  [place] to call system. Call bell, personal items and telephone within reach. Instruct patient to call for assistance. Room bathroom lighting operational. Non-slip footwear when patient is off stretcher. Physically safe environment: no spills, clutter or unnecessary equipment. Stretcher in lowest position, wheels locked, appropriate side rails in place.

## 2022-04-24 NOTE — H&P ADULT - NSHPLABSRESULTS_GEN_ALL_CORE
LABS: All Labs Reviewed:                          14.5   10.86 )-----------( 190      ( 24 Apr 2022 09:07 )             46.6       04-24    142  |  111<H>  |  24<H>  ----------------------------<  86  4.3   |  23  |  1.00    Ca    9.0      24 Apr 2022 09:07  Mg     2.2     04-24    TPro  7.1  /  Alb  3.1<L>  /  TBili  0.5  /  DBili  x   /  AST  17  /  ALT  17  /  AlkPhos  71  04-24      PT/INR - ( 24 Apr 2022 09:08 )   PT: 12.5 sec;   INR: 1.08 ratio         PTT - ( 24 Apr 2022 09:08 )  PTT:28.4 sec      LIVER FUNCTIONS - ( 24 Apr 2022 09:07 )  Alb: 3.1 g/dL / Pro: 7.1 gm/dL / ALK PHOS: 71 U/L / ALT: 17 U/L / AST: 17 U/L / GGT: x           EKG PVCs, NSR    : CT abd:1.  No acute abdominal/pelvic pathology or primary etiology of pain.  2.  Unchanged intra-/extra hepatic biliary duct dilatation to the level   of a periampullary duodenal diverticulum.  3.  Eccentric urinary bladder mural thickening and mucosal hyperemia,   unchanged. Correlation with urinalysis recommended    US abd: Distended gallbladder with stones and sludge with concern for positive   sonographic Romero sign/acute cholecystitis Intra and extrahepatic biliary ductal dilatation. Correlation with contrast-enhanced MRI/MRCP is recommended.    UA wc >50

## 2022-04-24 NOTE — H&P ADULT - NSHPREVIEWOFSYSTEMS_GEN_ALL_CORE
ROS: limited sec. to dementia , [place], she denies CP, or SOB.  denies nausea  or vomiting, has RUQ abdominal pain

## 2022-04-24 NOTE — ED PROVIDER NOTE - PHYSICAL EXAMINATION
Constitutional: NAD AAOx3  Eyes: PERRLA EOMI  Head: Normocephalic atraumatic  Mouth: MMM  Cardiac: regular rate   Resp: Lungs CTAB  GI: Abd s/ttp epigastrium and ruq. nd, no rebound or guarding.  Neuro: awake, alert, moving all extremities, cranial nerves 2-12 intact, sensation intact, no dysmetria.  Skin: No rashes

## 2022-04-25 LAB
ALBUMIN SERPL ELPH-MCNC: 3 G/DL — LOW (ref 3.3–5)
ALP SERPL-CCNC: 67 U/L — SIGNIFICANT CHANGE UP (ref 40–120)
ALT FLD-CCNC: 16 U/L — SIGNIFICANT CHANGE UP (ref 12–78)
ANION GAP SERPL CALC-SCNC: 10 MMOL/L — SIGNIFICANT CHANGE UP (ref 5–17)
AST SERPL-CCNC: 12 U/L — LOW (ref 15–37)
BASOPHILS # BLD AUTO: 0.09 K/UL — SIGNIFICANT CHANGE UP (ref 0–0.2)
BASOPHILS NFR BLD AUTO: 0.8 % — SIGNIFICANT CHANGE UP (ref 0–2)
BILIRUB SERPL-MCNC: 0.6 MG/DL — SIGNIFICANT CHANGE UP (ref 0.2–1.2)
BUN SERPL-MCNC: 39 MG/DL — HIGH (ref 7–23)
CALCIUM SERPL-MCNC: 8.4 MG/DL — LOW (ref 8.5–10.1)
CHLORIDE SERPL-SCNC: 105 MMOL/L — SIGNIFICANT CHANGE UP (ref 96–108)
CO2 SERPL-SCNC: 23 MMOL/L — SIGNIFICANT CHANGE UP (ref 22–31)
CREAT SERPL-MCNC: 1.7 MG/DL — HIGH (ref 0.5–1.3)
EGFR: 27 ML/MIN/1.73M2 — LOW
EOSINOPHIL # BLD AUTO: 0.16 K/UL — SIGNIFICANT CHANGE UP (ref 0–0.5)
EOSINOPHIL NFR BLD AUTO: 1.5 % — SIGNIFICANT CHANGE UP (ref 0–6)
GLUCOSE SERPL-MCNC: 109 MG/DL — HIGH (ref 70–99)
HCT VFR BLD CALC: 43.8 % — SIGNIFICANT CHANGE UP (ref 34.5–45)
HGB BLD-MCNC: 14.2 G/DL — SIGNIFICANT CHANGE UP (ref 11.5–15.5)
IMM GRANULOCYTES NFR BLD AUTO: 0.5 % — SIGNIFICANT CHANGE UP (ref 0–1.5)
LYMPHOCYTES # BLD AUTO: 2.18 K/UL — SIGNIFICANT CHANGE UP (ref 1–3.3)
LYMPHOCYTES # BLD AUTO: 20 % — SIGNIFICANT CHANGE UP (ref 13–44)
MAGNESIUM SERPL-MCNC: 2.2 MG/DL — SIGNIFICANT CHANGE UP (ref 1.6–2.6)
MCHC RBC-ENTMCNC: 29.2 PG — SIGNIFICANT CHANGE UP (ref 27–34)
MCHC RBC-ENTMCNC: 32.4 GM/DL — SIGNIFICANT CHANGE UP (ref 32–36)
MCV RBC AUTO: 90.1 FL — SIGNIFICANT CHANGE UP (ref 80–100)
MONOCYTES # BLD AUTO: 1.33 K/UL — HIGH (ref 0–0.9)
MONOCYTES NFR BLD AUTO: 12.2 % — SIGNIFICANT CHANGE UP (ref 2–14)
NEUTROPHILS # BLD AUTO: 7.1 K/UL — SIGNIFICANT CHANGE UP (ref 1.8–7.4)
NEUTROPHILS NFR BLD AUTO: 65 % — SIGNIFICANT CHANGE UP (ref 43–77)
PHOSPHATE SERPL-MCNC: 5.1 MG/DL — HIGH (ref 2.5–4.5)
PLATELET # BLD AUTO: 221 K/UL — SIGNIFICANT CHANGE UP (ref 150–400)
POTASSIUM SERPL-MCNC: 3.7 MMOL/L — SIGNIFICANT CHANGE UP (ref 3.5–5.3)
POTASSIUM SERPL-SCNC: 3.7 MMOL/L — SIGNIFICANT CHANGE UP (ref 3.5–5.3)
PROT SERPL-MCNC: 7.1 GM/DL — SIGNIFICANT CHANGE UP (ref 6–8.3)
RBC # BLD: 4.86 M/UL — SIGNIFICANT CHANGE UP (ref 3.8–5.2)
RBC # FLD: 14.1 % — SIGNIFICANT CHANGE UP (ref 10.3–14.5)
SODIUM SERPL-SCNC: 138 MMOL/L — SIGNIFICANT CHANGE UP (ref 135–145)
TSH SERPL-MCNC: 1.27 UU/ML — SIGNIFICANT CHANGE UP (ref 0.34–4.82)
WBC # BLD: 10.91 K/UL — HIGH (ref 3.8–10.5)
WBC # FLD AUTO: 10.91 K/UL — HIGH (ref 3.8–10.5)

## 2022-04-25 PROCEDURE — 99223 1ST HOSP IP/OBS HIGH 75: CPT

## 2022-04-25 PROCEDURE — 99223 1ST HOSP IP/OBS HIGH 75: CPT | Mod: 25

## 2022-04-25 PROCEDURE — 99233 SBSQ HOSP IP/OBS HIGH 50: CPT

## 2022-04-25 PROCEDURE — 99497 ADVNCD CARE PLAN 30 MIN: CPT | Mod: 25

## 2022-04-25 RX ADMIN — PIPERACILLIN AND TAZOBACTAM 25 GRAM(S): 4; .5 INJECTION, POWDER, LYOPHILIZED, FOR SOLUTION INTRAVENOUS at 13:41

## 2022-04-25 RX ADMIN — HEPARIN SODIUM 5000 UNIT(S): 5000 INJECTION INTRAVENOUS; SUBCUTANEOUS at 13:40

## 2022-04-25 RX ADMIN — PIPERACILLIN AND TAZOBACTAM 25 GRAM(S): 4; .5 INJECTION, POWDER, LYOPHILIZED, FOR SOLUTION INTRAVENOUS at 23:07

## 2022-04-25 RX ADMIN — SIMVASTATIN 20 MILLIGRAM(S): 20 TABLET, FILM COATED ORAL at 23:05

## 2022-04-25 RX ADMIN — AMLODIPINE BESYLATE 5 MILLIGRAM(S): 2.5 TABLET ORAL at 09:33

## 2022-04-25 RX ADMIN — PREGABALIN 1000 MICROGRAM(S): 225 CAPSULE ORAL at 09:33

## 2022-04-25 RX ADMIN — DORZOLAMIDE HYDROCHLORIDE TIMOLOL MALEATE 1 DROP(S): 20; 5 SOLUTION/ DROPS OPHTHALMIC at 23:06

## 2022-04-25 RX ADMIN — LATANOPROST 1 DROP(S): 0.05 SOLUTION/ DROPS OPHTHALMIC; TOPICAL at 23:05

## 2022-04-25 RX ADMIN — Medication 1 DROP(S): at 09:34

## 2022-04-25 RX ADMIN — Medication 5 MILLIGRAM(S): at 23:04

## 2022-04-25 RX ADMIN — PIPERACILLIN AND TAZOBACTAM 25 GRAM(S): 4; .5 INJECTION, POWDER, LYOPHILIZED, FOR SOLUTION INTRAVENOUS at 06:03

## 2022-04-25 RX ADMIN — Medication 1 DROP(S): at 23:06

## 2022-04-25 RX ADMIN — HEPARIN SODIUM 5000 UNIT(S): 5000 INJECTION INTRAVENOUS; SUBCUTANEOUS at 23:06

## 2022-04-25 RX ADMIN — Medication 1 MILLIGRAM(S): at 09:34

## 2022-04-25 RX ADMIN — DORZOLAMIDE HYDROCHLORIDE TIMOLOL MALEATE 1 DROP(S): 20; 5 SOLUTION/ DROPS OPHTHALMIC at 09:34

## 2022-04-25 NOTE — CONSULT NOTE ADULT - SUBJECTIVE AND OBJECTIVE BOX
Patient is a 89y old  Female who presents with a chief complaint of cholecystitis, bradycardia    HPI:  89 y/o female with h/o HTN, [place], low back pain, breast CA, [place], mild dementia was admitted on  for severe RUQ pain for the last 3 days PTA. The patient has a private aid who helps her. In ER, the patient had frequent PVC's and 2.8 second pause in heart monitor. Abd US showed cholecystitis, and the patient had surgical consult in the ED. The patient had a DNR at home and the patient daughter/ HCP reaffirmed the DNR /DNI, gave verbal consent. She also does not want to have a PPM. In ER she received zosyn.     PMHx: HTN, Lumbago, Breast CA, [place],   PSHx: R mastectomy, colon CA, back and hip surgeries.    Meds: per reconciliation sheet, noted below  MEDICATIONS  (STANDING):  amLODIPine   Tablet 5 milliGRAM(s) Oral daily  cyanocobalamin 1000 MICROGram(s) Oral daily  dorzolamide 2%/timolol 0.5% Ophthalmic Solution 1 Drop(s) Both EYES two times a day  folic acid 1 milliGRAM(s) Oral daily  heparin   Injectable 5000 Unit(s) SubCutaneous every 8 hours  latanoprost 0.005% Ophthalmic Solution 1 Drop(s) Both EYES at bedtime  melatonin 5 milliGRAM(s) Oral at bedtime  pilocarpine 4% Solution 1 Drop(s) Both EYES two times a day  piperacillin/tazobactam IVPB.. 3.375 Gram(s) IV Intermittent every 8 hours  simvastatin 20 milliGRAM(s) Oral at bedtime    MEDICATIONS  (PRN):    Allergies    No Known Allergies    Intolerances    Social: no smoking, no alcohol, no illegal drugs; no recent travel, no exposure to TB  FAMILY HISTORY:    no history of premature cardiovascular disease in first degree relatives    ROS: the patient is confused; limited; has abdominal pain, no diarrhea, no N/V, no dysuria, no leg pain, no rash, no joint aches  All other systems reviewed and are negative    Vital Signs Last 24 Hrs  T(C): 36.5 (2022 10:41), Max: 36.9 (2022 15:56)  T(F): 97.7 (2022 10:41), Max: 98.5 (2022 15:56)  HR: 62 (2022 10:41) (62 - 89)  BP: 126/54 (2022 10:41) (126/54 - 187/76)  BP(mean): 108 (2022 15:56) (108 - 108)  RR: 18 (2022 10:41) (18 - 18)  SpO2: 95% (2022 10:41) (93% - 97%)  Daily     Daily     PE:    Constitutional:  No acute distress  HEENT: NC/AT, EOMI, PERRLA, conjunctivae clear; ears and nose atraumatic; pharynx benign  Neck: supple; thyroid not palpable  Back: no tenderness  Respiratory: respiratory effort normal; clear to auscultation  Cardiovascular: S1S2 regular, no murmurs  Abdomen: soft, RUQ tender, not distended, positive BS; no liver or spleen organomegaly  Genitourinary: no suprapubic tenderness  Lymphatic: no LN palpable  Musculoskeletal: no muscle tenderness, no joint swelling or tenderness  Extremities: no pedal edema  Neurological/ Psychiatric: confused, judgement and insight impaired; moving all extremities  Skin: no rashes; no palpable lesions    Labs: all available labs reviewed                        14.2   10.91 )-----------( 221      ( 2022 12:30 )             43.8     04-24    142  |  111<H>  |  24<H>  ----------------------------<  86  4.3   |  23  |  1.00    Ca    9.0      2022 09:07  Mg     2.2     04-24    TPro  7.1  /  Alb  3.1<L>  /  TBili  0.5  /  DBili  x   /  AST  17  /  ALT  17  /  AlkPhos  71  04-24     LIVER FUNCTIONS - ( 2022 09:07 )  Alb: 3.1 g/dL / Pro: 7.1 gm/dL / ALK PHOS: 71 U/L / ALT: 17 U/L / AST: 17 U/L / GGT: x           Urinalysis Basic - ( 2022 10:58 )    Color: Yellow / Appearance: very cloudy / S.015 / pH: x  Gluc: x / Ketone: Negative  / Bili: Negative / Urobili: Negative   Blood: x / Protein: 100 / Nitrite: Negative   Leuk Esterase: Moderate / RBC: 0-2 /HPF / WBC >50   Sq Epi: x / Non Sq Epi: Many / Bacteria: Many    COVID-19 PCR: NotDetec (22 @ 06:45)      Culture - Urine (collected 2022 10:58)  Source: Clean Catch None  Preliminary Report (2022 10:29):    >100,000 CFU/ml Gram Negative Rods    Radiology: all available radiological tests reviewed    < from: CT Abdomen and Pelvis w/ IV Cont (22 @ 11:28) >  1.  No acute abdominal/pelvic pathology or primary etiology of pain.  2.  Unchanged intra-/extra hepatic biliary duct dilatation to the level   of a periampullary duodenal diverticulum.  3.  Eccentric urinary bladder mural thickening and mucosal hyperemia,   unchanged.   < end of copied text >      Advanced directives addressed: full resuscitation

## 2022-04-25 NOTE — PROGRESS NOTE ADULT - ASSESSMENT
ASSESSMENT/PLAN    Cholelithiasis With Acute Cholecystisis  UTI POA. No Sepsis PO  -Symptoms resolved  -No hyperbilirubinnemia, Lipase normal, LFTs normal  -GI/Surgery evaluation  -If symptoms worsening, MRCP as per GI  -Advance diet as tolerated.    Bradycardia + Pauses  -Telemetry  -reported more frequent/progressive pauseas  -Troponin negative  -Currently asymptomatic  -Patient AAOx1-2 verbalzied refual of PPM and/or transcutaneous pacing. Daughter, Charlotte (2nd HCP) at bedside and confirmed to proceed with patient's verbalized wishes. Advised that rapid decline can occur including this hospitalization. Both verbalized understanding.     Mild Dementia  Personal History of Breast Ca  Chronic Low Back Pain  HTN    DVT Prophylaxis: heparing subq  Disposition: palliative care evaluation with possible home hospice referral. Family and patient aware of progressive pauses and possible rapid decline. As per patient nad daughter at bedside, confirmed DNR, denial of PPM  placement and utilization of transcutaneous pacing.    ASSESSMENT/PLAN    Cholelithiasis With Acute Cholecystisis  -Symptoms resolved  -No hyperbilirubinnemia, Lipase normal, LFTs normal  -GI/Surgery evaluation  -If symptoms worsening, MRCP as per GI  -Advance diet as tolerated.    GNR UTI  -Urine culture with >100K CFU  -Empiric abx  -Titrate Abx accordingly/    Bradycardia + Pauses  -Telemetry  -reported more frequent/progressive pauseas  -Troponin negative  -Currently asymptomatic  -Patient AAOx1-2 verbalzied refual of PPM and/or transcutaneous pacing. Daughter, Charlotte (2nd HCP) at bedside and confirmed to proceed with patient's verbalized wishes. Advised that rapid decline can occur including this hospitalization. Both verbalized understanding.     Mild Dementia  Personal History of Breast Ca  Chronic Low Back Pain  HTN    DVT Prophylaxis: heparin subq  Disposition: palliative care evaluation with possible home hospice referral. Family and patient aware of progressive pauses and possible rapid decline. As per patient nad daughter at bedside, confirmed DNR, denial of PPM  placement and utilization of transcutaneous pacing.    ASSESSMENT/PLAN    Cholelithiasis With Acute Cholecystisis  -Symptoms resolved  -No hyperbilirubinnemia, Lipase normal, LFTs normal  -GI/Surgery evaluation  -If symptoms worsening, MRCP as per GI  -Advance diet as tolerated.    Asymptomatic Bradycardia with signficant pauses  -Troponin negative  -Patient AAOx1-2 verbalzied refual of PPM and/or transcutaneous pacing. Daughter, Charlotte (2nd HCP) at bedside and confirmed to proceed with patient's verbalized wishes. Advised that rapid decline can occur including this hospitalization. Both verbalized understanding.   -Could be from BB administration  -EP consulted  -Telemetry x 24 hours to observe washout  -Patient/Family refused PPM and/or any artifical pacing.   -Role of telemetry is to be able to gauge BB wash out and gauge prognosis to be able to inform family what may to expect at the time of discharge.   -Role of telemetry is for evaluation ONLY. No action to be taken on events on telemetry including artifical pacing (Rx or electrical) and/or PPM deferred.   -Plan to discontinue telemetry in the am to gauge if pauses are persistent after washout. If pauses persistent family informed that this could indicate a sooner decline rather than later.     GNR UTI  -Urine culture with >100K CFU  -Empiric abx  -Titrate Abx accordingly/    Mild Dementia  Personal History of Breast Ca  Chronic Low Back Pain  HTN    DVT Prophylaxis: heparin subq  Disposition: palliative care evaluation with possible home hospice referral. Family and patient aware of progressive pauses and possible rapid decline. As per patient nad daughter at bedside, confirmed DNR, denial of PPM  placement and utilization of transcutaneous pacing.  Palliative care evaluated and discussed with family. To continue Tx for UTI. No intervention for gall bladder or bradycardia/pauses. Home hospice referral.   DNR/DNI

## 2022-04-25 NOTE — CONSULT NOTE ADULT - ASSESSMENT
89 year old female with history dementia, DNR/DNI with RUQ abdominal pain in the setting of +UTI and ?cholecystitis via sonogram. Patient with chronic CBD dilatation, no difference from CT 2017, unlikely acute cholecystitis. Unable to illicit + gamez's sign--patient diffusely nontender upon deep palpation of abdomen. LFT's all normal. WBC with slight elev. Seems more likely r/t UTI. However, should pain worsen/symptoms do not improve with IV abx and hydration, then would recommend MRCP.     Plan:  ::Diet as tyrone  ::IVF per med  ::IV abx. per med  ::Pain control prn    Plan discussed with Dr. Nguyễn

## 2022-04-25 NOTE — CONSULT NOTE ADULT - SUBJECTIVE AND OBJECTIVE BOX
HPI: Ms. Peoples is a 89y old Female with hx of dementia (FAST 6e), HTN, breast ca, presenting to ED with RUQ pain x three days. In ED, patient's private health aide provided most of history. RUQ sono showed ? cholecystitis, dilated gallbladder, + gamez's sign, and sludge/stones. CT with + CBD dilatation unchanged from 2017. Incidentally did have episodes of pauses on EKG in ED, and per daughter patient is DNR/DNI and does not want PPM.   Patient seen at bedside, pleasantly confused but cooperative. Unable to obtain history due to dementia.    PAIN: ( )Yes   ( )No  Level:  Location:  Intensity:    /10  Quality:  Aggravating Factors:  Alleviating Factors:  Radiation:  Duration/Timing:  Impact on ADLs:    DYSPNEA: ( ) Yes  ( ) No  Level:    PAST MEDICAL & SURGICAL HISTORY:  Cancer    Breast cancer  Masectomy: Right    No significant past surgical history        SOCIAL HX:    Hx opiate tolerance ( )YES  ( )NO    Baseline ADLs  (Prior to Admission)  ( ) Independent   ( )Dependent    FAMILY HISTORY:      Review of Systems:    Anxiety-  Depression-  Physical Discomfort-  Dyspnea-  Constipation-  Diarrhea-  Nausea-  Vomiting-  Anorexia-  Weight Loss-   Cough-  Secretions-  Fatigue-  Weakness-  Delirium-    All other systems reviewed and negative  Unable to obtain/Limited due to:      PHYSICAL EXAM:    Vital Signs Last 24 Hrs  T(C): 36.5 (25 Apr 2022 10:41), Max: 36.9 (24 Apr 2022 15:56)  T(F): 97.7 (25 Apr 2022 10:41), Max: 98.5 (24 Apr 2022 15:56)  HR: 62 (25 Apr 2022 10:41) (62 - 89)  BP: 126/54 (25 Apr 2022 10:41) (126/54 - 187/76)  BP(mean): 108 (24 Apr 2022 15:56) (108 - 108)  RR: 18 (25 Apr 2022 10:41) (18 - 18)  SpO2: 95% (25 Apr 2022 10:41) (93% - 97%)  Daily     Daily     PPSV2:   %  FAST:    General:  Mental Status:  HEENT:  Lungs:  Cardiac:  GI:  :  Ext:  Neuro:      LABS:                        14.2   10.91 )-----------( 221      ( 25 Apr 2022 12:30 )             43.8     04-25    138  |  105  |  39<H>  ----------------------------<  109<H>  3.7   |  23  |  1.70<H>    Ca    8.4<L>      25 Apr 2022 12:30  Phos  5.1     04-25  Mg     2.2     04-25    TPro  7.1  /  Alb  3.0<L>  /  TBili  0.6  /  DBili  x   /  AST  12<L>  /  ALT  16  /  AlkPhos  67  04-25    PT/INR - ( 24 Apr 2022 09:08 )   PT: 12.5 sec;   INR: 1.08 ratio         PTT - ( 24 Apr 2022 09:08 )  PTT:28.4 sec  Albumin: Albumin, Serum: 3.0 g/dL (04-25 @ 12:30)      Allergies    No Known Allergies    Intolerances      MEDICATIONS  (STANDING):  amLODIPine   Tablet 5 milliGRAM(s) Oral daily  cyanocobalamin 1000 MICROGram(s) Oral daily  dorzolamide 2%/timolol 0.5% Ophthalmic Solution 1 Drop(s) Both EYES two times a day  folic acid 1 milliGRAM(s) Oral daily  heparin   Injectable 5000 Unit(s) SubCutaneous every 8 hours  latanoprost 0.005% Ophthalmic Solution 1 Drop(s) Both EYES at bedtime  melatonin 5 milliGRAM(s) Oral at bedtime  pilocarpine 4% Solution 1 Drop(s) Both EYES two times a day  piperacillin/tazobactam IVPB.. 3.375 Gram(s) IV Intermittent every 8 hours  simvastatin 20 milliGRAM(s) Oral at bedtime    MEDICATIONS  (PRN):      RADIOLOGY/ADDITIONAL STUDIES:   HPI: Ms. Peoples is a 89y old Female with hx of dementia (FAST 6e), HTN, breast ca, admitted on 4/24 for RUQ pains. Found to have CT with + CBD dilatation unchanged from 2017., with concern for cholecystitis and +UA. Course c/b episodes of pauses on EKG in ED, and per daughter patient is DNR/DNI and does not want PPM. Palliative Care consulted to assist with establishing GOC.     Met Ms. Peoples this afternoon, daughter/HCP2 Magali at bedside. Pt feeling great, denies pain, dyspnea, or any discomfort. No complaints.     Daughter open to GOC conversation with son/HCP1 present via phone as noted below in GOC section.     PAIN: ( )Yes   ( )No  Level:  Location:  Intensity:    /10  Quality:  Aggravating Factors:  Alleviating Factors:  Radiation:  Duration/Timing:  Impact on ADLs:    DYSPNEA: ( ) Yes  ( ) No  Level:    PAST MEDICAL & SURGICAL HISTORY:  Cancer    Breast cancer  Masectomy: Right    No significant past surgical history        SOCIAL HX:    Hx opiate tolerance ( )YES  ( )NO    Baseline ADLs  (Prior to Admission)  ( ) Independent   ( )Dependent    FAMILY HISTORY:      Review of Systems:    Anxiety-  Depression-  Physical Discomfort-  Dyspnea-  Constipation-  Diarrhea-  Nausea-  Vomiting-  Anorexia-  Weight Loss-   Cough-  Secretions-  Fatigue-  Weakness-  Delirium-    All other systems reviewed and negative  Unable to obtain/Limited due to:      PHYSICAL EXAM:    Vital Signs Last 24 Hrs  T(C): 36.5 (25 Apr 2022 10:41), Max: 36.9 (24 Apr 2022 15:56)  T(F): 97.7 (25 Apr 2022 10:41), Max: 98.5 (24 Apr 2022 15:56)  HR: 62 (25 Apr 2022 10:41) (62 - 89)  BP: 126/54 (25 Apr 2022 10:41) (126/54 - 187/76)  BP(mean): 108 (24 Apr 2022 15:56) (108 - 108)  RR: 18 (25 Apr 2022 10:41) (18 - 18)  SpO2: 95% (25 Apr 2022 10:41) (93% - 97%)  Daily     Daily     PPSV2:   %  FAST:    General:  Mental Status:  HEENT:  Lungs:  Cardiac:  GI:  :  Ext:  Neuro:      LABS:                        14.2   10.91 )-----------( 221      ( 25 Apr 2022 12:30 )             43.8     04-25    138  |  105  |  39<H>  ----------------------------<  109<H>  3.7   |  23  |  1.70<H>    Ca    8.4<L>      25 Apr 2022 12:30  Phos  5.1     04-25  Mg     2.2     04-25    TPro  7.1  /  Alb  3.0<L>  /  TBili  0.6  /  DBili  x   /  AST  12<L>  /  ALT  16  /  AlkPhos  67  04-25    PT/INR - ( 24 Apr 2022 09:08 )   PT: 12.5 sec;   INR: 1.08 ratio         PTT - ( 24 Apr 2022 09:08 )  PTT:28.4 sec  Albumin: Albumin, Serum: 3.0 g/dL (04-25 @ 12:30)      Allergies    No Known Allergies    Intolerances      MEDICATIONS  (STANDING):  amLODIPine   Tablet 5 milliGRAM(s) Oral daily  cyanocobalamin 1000 MICROGram(s) Oral daily  dorzolamide 2%/timolol 0.5% Ophthalmic Solution 1 Drop(s) Both EYES two times a day  folic acid 1 milliGRAM(s) Oral daily  heparin   Injectable 5000 Unit(s) SubCutaneous every 8 hours  latanoprost 0.005% Ophthalmic Solution 1 Drop(s) Both EYES at bedtime  melatonin 5 milliGRAM(s) Oral at bedtime  pilocarpine 4% Solution 1 Drop(s) Both EYES two times a day  piperacillin/tazobactam IVPB.. 3.375 Gram(s) IV Intermittent every 8 hours  simvastatin 20 milliGRAM(s) Oral at bedtime    MEDICATIONS  (PRN):      RADIOLOGY/ADDITIONAL STUDIES:   HPI: Ms. Peoples is a 89y old Female with hx of dementia (FAST 6e), HTN, breast ca, admitted on 4/24 for RUQ pains. Found to have CT with + CBD dilatation unchanged from 2017., with concern for cholecystitis and +UA. Course c/b episodes of pauses on EKG in ED, and per daughter patient is DNR/DNI and does not want PPM. Palliative Care consulted to assist with establishing GOC.     Met Ms. Peoples this afternoon, daughter/HCP2 Magali at bedside. Pt feeling great, denies pain, dyspnea, or any discomfort. No complaints.     Daughter open to GOC conversation with son/HCP1 present via phone as noted below in GOC section.     PAIN: ( )Yes   (x )No    DYSPNEA: ( ) Yes  (x ) No    PAST MEDICAL & SURGICAL HISTORY:  Cancer  Breast cancer  Masectomy: Right    No significant past surgical history        SOCIAL HX: Lives at home with 24hr aids    Hx opiate tolerance ( )YES  (x )NO    Baseline ADLs  (Prior to Admission)  ( ) Independent   (x )Dependent    FAMILY HISTORY:  unable to gather 2/2 to dementia    Review of Systems:    Anxiety- denies  Depression- denies  Constipation- denies  Diarrhea- denies  Nausea- denies  Vomiting- denies  Anorexia- denies  Weight Loss-  unsure  Cough- denies  Secretions- denies  Fatigue- denies  Weakness- yes, sometimes  Delirium- forgetful    All other systems reviewed and negative      PHYSICAL EXAM:    Vital Signs Last 24 Hrs  T(C): 36.5 (25 Apr 2022 10:41), Max: 36.9 (24 Apr 2022 15:56)  T(F): 97.7 (25 Apr 2022 10:41), Max: 98.5 (24 Apr 2022 15:56)  HR: 62 (25 Apr 2022 10:41) (62 - 89)  BP: 126/54 (25 Apr 2022 10:41) (126/54 - 187/76)  BP(mean): 108 (24 Apr 2022 15:56) (108 - 108)  RR: 18 (25 Apr 2022 10:41) (18 - 18)  SpO2: 95% (25 Apr 2022 10:41) (93% - 97%)  Daily     Daily     PPSV2: 30  %  FAST: 6e (incontinent)    General: Elderly female sitting up in bed, smiling, NAD  Mental Status: Aox 2 self and place  HEENT: mmm, + temporal wasting  Lungs: dec at bases  Cardiac: + s1 s2 rrr  GI: soft nt nd +bs  : incontinent  MSK/skin: moves all extremities, muscle and fat wasting in limbs  Neuro: no focal deficits      LABS:                        14.2   10.91 )-----------( 221      ( 25 Apr 2022 12:30 )             43.8     04-25    138  |  105  |  39<H>  ----------------------------<  109<H>  3.7   |  23  |  1.70<H>    Ca    8.4<L>      25 Apr 2022 12:30  Phos  5.1     04-25  Mg     2.2     04-25    TPro  7.1  /  Alb  3.0<L>  /  TBili  0.6  /  DBili  x   /  AST  12<L>  /  ALT  16  /  AlkPhos  67  04-25    PT/INR - ( 24 Apr 2022 09:08 )   PT: 12.5 sec;   INR: 1.08 ratio         PTT - ( 24 Apr 2022 09:08 )  PTT:28.4 sec  Albumin: Albumin, Serum: 3.0 g/dL (04-25 @ 12:30)      Allergies    No Known Allergies    Intolerances      MEDICATIONS  (STANDING):  amLODIPine   Tablet 5 milliGRAM(s) Oral daily  cyanocobalamin 1000 MICROGram(s) Oral daily  dorzolamide 2%/timolol 0.5% Ophthalmic Solution 1 Drop(s) Both EYES two times a day  folic acid 1 milliGRAM(s) Oral daily  heparin   Injectable 5000 Unit(s) SubCutaneous every 8 hours  latanoprost 0.005% Ophthalmic Solution 1 Drop(s) Both EYES at bedtime  melatonin 5 milliGRAM(s) Oral at bedtime  pilocarpine 4% Solution 1 Drop(s) Both EYES two times a day  piperacillin/tazobactam IVPB.. 3.375 Gram(s) IV Intermittent every 8 hours  simvastatin 20 milliGRAM(s) Oral at bedtime    MEDICATIONS  (PRN):      RADIOLOGY/ADDITIONAL STUDIES:    ACC: [id number] EXAM:  US ABDOMEN RT UPR QUADRANT                        PROCEDURE DATE:  04/24/2022      IMPRESSION:  Distended gallbladder with stones and sludge with concern for positive   sonographic Romero sign/acute cholecystitis.    Intra and extrahepatic biliary ductal dilatation. Correlation with   contrast-enhanced MRI/MRCP is recommended.    ACC: [id number] EXAM:  CT ABDOMEN AND PELVIS IC                        PROCEDURE DATE:  04/24/2022      IMPRESSION:  1.  No acute abdominal/pelvic pathology or primary etiology of pain.  2.  Unchanged intra-/extra hepatic biliary duct dilatation to the level   of a periampullary duodenal diverticulum.  3.  Eccentric urinary bladder mural thickening and mucosal hyperemia,   unchanged. Correlation with urinalysis recommended

## 2022-04-25 NOTE — CONSULT NOTE ADULT - ASSESSMENT
89 y/o female with h/o HTN, [place], low back pain, breast CA, [place], mild dementia was admitted on 4/24 for severe RUQ pain for the last 3 days PTA. The patient has a private aid who helps her. In ER, the patient had frequent PVC's and 2.8 second pause in heart monitor. Abd US showed cholecystitis, and the patient had surgical consult in the ED. The patient had a DNR at home and the patient daughter/ HCP reaffirmed the DNR /DNI, gave verbal consent. She also does not want to have a PPM. In ER she received zosyn.     1. Acute cholecystitis. Pyuria. UTI with GNR.   -leukocytosis  -obtain BC x 2, urine c/s  -noted with CBD dilatation  -agree with zosyn 3.375 gm IV q9h  -reason for abx use and side effects reviewed with patient; monitor BMP  -monitor abdomen  -old chart reviewed to assess prior cultures  -monitor temps  -f/u CBC  -supportive care  2. Other issues:   -care per medicine         89 y/o female with h/o HTN, [place], low back pain, breast CA, [place], mild dementia was admitted on 4/24 for severe RUQ pain for the last 3 days PTA. The patient has a private aid who helps her. In ER, the patient had frequent PVC's and 2.8 second pause in heart monitor. Abd US showed cholecystitis, and the patient had surgical consult in the ED. The patient had a DNR at home and the patient daughter/ HCP reaffirmed the DNR /DNI, gave verbal consent. She also does not want to have a PPM. In ER she received zosyn.     1. Acute cholecystitis. Pyuria. UTI with GNR. Bradycardia.   -leukocytosis  -obtain BC x 2, urine c/s  -noted with CBD dilatation  -agree with zosyn 3.375 gm IV q9h  -reason for abx use and side effects reviewed with patient; monitor BMP  -monitor abdomen  -old chart reviewed to assess prior cultures  -monitor temps  -f/u CBC  -supportive care  2. Other issues:   -care per medicine

## 2022-04-25 NOTE — DIETITIAN NUTRITION RISK NOTIFICATION - ADDITIONAL COMMENTS/DIETITIAN RECOMMENDATIONS
Continue diet as tolerated.   Ensure Clear TID to optimize po intake and provide an additional 240 kcal, 8g protein, 0g fat per serving.  Continue vitamin B12 and folic acid supplementation, add MVI daily.  Encourage po intake, monitor diet tolerance, and provide assistance at meals as needed.  Obtain daily weights to monitor trends.

## 2022-04-25 NOTE — CONSULT NOTE ADULT - SUBJECTIVE AND OBJECTIVE BOX
Patient is a 89y old  Female who presents with a chief complaint of cholecystitis, bradycardia, (25 Apr 2022 10:39)    HPI:  This is a 89 year old female with past medical history significant for dementia, htn, breast ca presenting to ED with RUQ pain x three days. In ED, patient's private health aide provided most of history.   RUQ sono showed ? cholecystitis, dilated gallbladder, + romero's sign, and sludge/stones. CT with + CBD dilatation unchanged from 2017. Incidentally did have episodes of pauses on EKG in ED, and per daughter patient is DNR/DNI and does not want PPM.   Patient seen at bedside, pleasantly confused but cooperative. Unable to obtain history due to dementia.     PAST MEDICAL & SURGICAL HISTORY:  Cancer    Breast cancer  Mastectomy: Right    No significant past surgical history    MEDICATIONS  (STANDING):  amLODIPine Tablet 5 milliGRAM(s) Oral daily  cyanocobalamin 1000 MICROGram(s) Oral daily  dorzolamide 2%/timolol 0.5% Ophthalmic Solution 1 Drop(s) Both EYES two times a day  folic acid 1 milliGRAM(s) Oral daily  heparin   Injectable 5000 Unit(s) SubCutaneous every 8 hours  latanoprost 0.005% Ophthalmic Solution 1 Drop(s) Both EYES at bedtime  melatonin 5 milliGRAM(s) Oral at bedtime  pilocarpine 4% Solution 1 Drop(s) Both EYES two times a day  piperacillin/tazobactam IVPB.. 3.375 Gram(s) IV Intermittent every 8 hours  simvastatin 20 milliGRAM(s) Oral at bedtime    MEDICATIONS  (PRN):    Allergies    No Known Allergies    Intolerances    SOCIAL HISTORY:    FAMILY HISTORY:    REVIEW OF SYSTEMS:    CONSTITUTIONAL: No weakness, fevers or chills  EYES/ENT: No visual changes;  No vertigo or throat pain   NECK: No pain or stiffness  RESPIRATORY: No cough, wheezing, hemoptysis; No shortness of breath  CARDIOVASCULAR: No chest pain or palpitations  GASTROINTESTINAL: See HPI  GENITOURINARY: No dysuria, frequency or hematuria  NEUROLOGICAL: No numbness or weakness  SKIN: No itching, burning, rashes, or lesions   PSYCH: Normal mood and affect  All other review of systems is negative unless indicated above.    Vital Signs Last 24 Hrs  T(C): 36.5 (25 Apr 2022 10:41), Max: 36.9 (24 Apr 2022 15:56)  T(F): 97.7 (25 Apr 2022 10:41), Max: 98.5 (24 Apr 2022 15:56)  HR: 62 (25 Apr 2022 10:41) (62 - 89)  BP: 126/54 (25 Apr 2022 10:41) (126/54 - 187/76)  BP(mean): 108 (24 Apr 2022 15:56) (108 - 108)  RR: 18 (25 Apr 2022 10:41) (18 - 18)  SpO2: 95% (25 Apr 2022 10:41) (92% - 97%)    PHYSICAL EXAM:    Constitutional: No acute distress, frail, non-toxic appearing  HEENT: good phonation, not icteric  Neck: supple, no lymphadenopathy  Respiratory: clear to ascultation bilaterally, no wheezing  Cardiovascular: S1 and S2, regular rate and rhythm, no murmurs rubs or gallops  Gastrointestinal: soft, non-tender, non-distended, +bowel sounds, no rebound or guarding, no surgical scars, no drains  Extremities: No peripheral edema, no cyanosis or clubbing  Vascular: 2+ peripheral pulses, no venous stasis  Neurological: A/O x 3, no focal deficits, no asterixis  Psychiatric: Normal mood, normal affect  Skin: No rashes, not jaundiced    LABS:                                14.5   10.86 )-----------( 190      ( 24 Apr 2022 09:07 )             46.6       04-24    142  |  111<H>  |  24<H>  ----------------------------<  86  4.3   |  23  |  1.00    Ca    9.0      24 Apr 2022 09:07  Mg     2.2     04-24    TPro  7.1  /  Alb  3.1<L>  /  TBili  0.5  /  DBili  x   /  AST  17  /  ALT  17  /  AlkPhos  71  04-24      PT/INR - ( 24 Apr 2022 09:08 )   PT: 12.5 sec;   INR: 1.08 ratio         PTT - ( 24 Apr 2022 09:08 )  PTT:28.4 sec      RADIOLOGY & ADDITIONAL STUDIES:  IMPRESSION:  Distended gallbladder with stones and sludge with concern for positive   sonographic Romero sign/acute cholecystitis.    Intra and extrahepatic biliary ductal dilatation. Correlation with   contrast-enhanced MRI/MRCP is recommended.

## 2022-04-25 NOTE — PHYSICAL THERAPY INITIAL EVALUATION ADULT - GENERAL OBSERVATIONS, REHAB EVAL
Patient received in bed on 3E, +HM. +IV. Patient denied pain at rest, but c/o R sided abdominal pain with activity.

## 2022-04-25 NOTE — DIETITIAN INITIAL EVALUATION ADULT - PERTINENT LABORATORY DATA
04-24    142  |  111<H>  |  24<H>  ----------------------------<  86  4.3   |  23  |  1.00    Ca    9.0      24 Apr 2022 09:07  Mg     2.2     04-24    TPro  7.1  /  Alb  3.1<L>  /  TBili  0.5  /  DBili  x   /  AST  17  /  ALT  17  /  AlkPhos  71  04-24

## 2022-04-25 NOTE — CONSULT NOTE ADULT - CONVERSATION DETAILS
Met with Ms. Peoples's family to review her medical issues and their wishes for her care. Met with Ms. Peoples's family to review her medical issues and their wishes for her care. They explained that the pt lives in her home with 24/7 private aids for the past 4 years. Prior to that she had some aid hours and hospitalization at that time prompted recommendation for more oversight. She is still able to ambulate with assistance and RW, can dress herself, can get to the bathroom, but has also started wearing depends due to incontinence. She has had minor falls with no injury upon ED eval. Pt still recognizes them and can carry a decent conversation, but certainly has dementia. They understand she is aging and want to avoid any invasive procedures, while considering if further limits need to be considered.     We went on to consider current ailments alongside underlying chronic issues. They expect a certain amount of decline with pt's dementia, but also note chronic recurrent UTIs. They also share conversations they have had with teams thus far. They understand that the pt came in with possibility of cholecystitis, glad for update from GI noting this is unlikely considering normalcy of other indices and unchanged nature of CBD on imaging. They were not keen on procedures, so they were glad to know that MRCP was cancelled- knowing GI recommended going forward with this only if sx worsened, which they have not. Likewise, they were also aware of pt's pauses, becoming emotional when they heard from primary team that this was worsening and could be consistent with poorer prognosis of less than 6 months.     Spent time giving this possibility context in terms of pt's underlying dementia and pauses. Explained that though we hope pt stabilizes and is able to have more time, they agree that QOL is paramount to them. They also agree that when considering her age and comorbidities it is not outside of the realm of possibility for pt to die from expected or unexpected complications. They noted pt's known wishes to be DNR and DNI, and that at this point in her life invasive interventions or even repeated hospitalizations are not ideal. Thus, when we discussed dispo options we reviewed CHU (not ideal due to tendency to sundown), home palliative ("I have something bad, but I still want workup and return to hospital); and home hospice ("I have something bad and i'd prefer to be at home with focus on my symptoms). They agreed with home VNS hospice referral for more information and will decide on home pall vs. home hospice after this.     Ultimately, family is devoted to the pt's care and having a good plan for her that allows for dignity and minimization of interventions that she would not want. They are willing to continue with tx of UTI, while hearing from hospice and discerning whether hospice or pall are the best plans for their family. Floor SW Bettye made aware and placing referral. Dr. Gauthier and RN Arun made aware of above as well. Family is open to follow up from \[facility]\"" team tomorrow, I will return on Wednesday.

## 2022-04-25 NOTE — DIETITIAN INITIAL EVALUATION ADULT - ETIOLOGY
related to inability to meet sufficient protein-energy in setting of advanced age, dementia, now with abdominal pain and acute cholecystitis

## 2022-04-25 NOTE — PHYSICAL THERAPY INITIAL EVALUATION ADULT - PERTINENT HX OF CURRENT PROBLEM, REHAB EVAL
89 yo female with Hx. of HTN, [place],  Low back pain, breast CA, [place], mild dementia  who presented in the ED for sever RUQ pain for the last 3 days. Patient had frequent PVC's and 2.8 second pause in heart monitor while in the ED.  Abd US showed cholecystitis, Patient refusing PPM.

## 2022-04-25 NOTE — PHYSICAL THERAPY INITIAL EVALUATION ADULT - PLANNED THERAPY INTERVENTIONS, PT EVAL
Today: therapeutic exercises x 12  min, gait training x 12 min/bed mobility training/gait training/transfer training

## 2022-04-25 NOTE — DIETITIAN INITIAL EVALUATION ADULT - ADD RECOMMEND
Continue vitamin B12 and folic acid supplementation, add MVI daily. Encourage po intake, monitor diet tolerance, and provide assistance at meals as needed. Obtain daily weights to monitor trends.

## 2022-04-25 NOTE — DIETITIAN INITIAL EVALUATION ADULT - OTHER INFO
89 year old female with PMH of HTN, breast cancer, dementia who presents with abdominal pain x 1 week and dilated CBD with duodenal diverticulum, acute cholecystitis, no evidence of pericholecystic fluid. Pt AxOx1, unable to provide nutrition hx; no family present at bedside at this time. Per EMR review, pts weight in 11/2021 was 145 lbs, current admission weight is 130 lbs which is indicative of a 15 lb wt loss x ~5 months. Breakfast tray observed at bedside untouched. NFPE conducted. RD to follow up.

## 2022-04-25 NOTE — PROGRESS NOTE ADULT - ASSESSMENT
89 yo F with abdominal pain and dilated CBD with duodenal diverticulum  Possible cholecystitis, no evidence of pericholecystic fluid     Plan:  GI consult  NPO  IV fluids  Analgesia  IV antibiotics  MRCP  Medical management as per primary team  Trend LFTs  Surgery will follow    Plan discussed with Dr. Mosqueda

## 2022-04-25 NOTE — DIETITIAN INITIAL EVALUATION ADULT - SIGNS/SYMPTOMS
as evidenced by severe muscle/fat loss, 10.3% weight loss; suspect meeting <75% nutrient needs >1 mo

## 2022-04-25 NOTE — CONSULT NOTE ADULT - SUBJECTIVE AND OBJECTIVE BOX
HPI:  HPI: The patient is a 89 yo female wiht Hx. of HTN, [place],  Low back pain, breast CA,[place], mild dementia  who presented in the ED for sever RUQ pain for the last 3 days. The patient has a private aid  who helps with the Hx. The patient daughter also arrived from [place]. The patient also had frequent PVC's and 2.8 second pause in heart monitor while in the ED.  Abd US showed cholecystitis, and the patient had surgical consult in the ED. The patient had a DNR at home and the patient daughter who is her HCP reaffirmed the DNR /DNI, gave verbal consent. She also does not want to have a PPM.           PAST MEDICAL & SURGICAL HISTORY:  Cancer    Breast cancer  Masectomy: Right    No significant past surgical history        MEDICATIONS  (STANDING):  amLODIPine   Tablet 5 milliGRAM(s) Oral daily  cyanocobalamin 1000 MICROGram(s) Oral daily  dorzolamide 2%/timolol 0.5% Ophthalmic Solution 1 Drop(s) Both EYES two times a day  folic acid 1 milliGRAM(s) Oral daily  heparin   Injectable 5000 Unit(s) SubCutaneous every 8 hours  latanoprost 0.005% Ophthalmic Solution 1 Drop(s) Both EYES at bedtime  melatonin 5 milliGRAM(s) Oral at bedtime  pilocarpine 4% Solution 1 Drop(s) Both EYES two times a day  piperacillin/tazobactam IVPB.. 3.375 Gram(s) IV Intermittent every 8 hours  simvastatin 20 milliGRAM(s) Oral at bedtime    MEDICATIONS  (PRN):      Allergies    No Known Allergies      SOCIAL HISTORY: denies smoking drinking or illicit drug use      Vital Signs Last 24 Hrs  T(C): 36.5 (2022 10:41), Max: 36.9 (2022 15:56)  T(F): 97.7 (2022 10:41), Max: 98.5 (2022 15:56)  HR: 62 (2022 10:41) (62 - 89)  BP: 126/54 (2022 10:41) (126/54 - 187/76)  BP(mean): 108 (2022 15:56) (108 - 108)  RR: 18 (2022 10:41) (18 - 18)  SpO2: 95% (2022 10:41) (93% - 97%)      CONSTITUTIONAL: No fever, weight loss, chills, shakes, or fatigue  EYES: No eye pain, visual disturbances, or discharge  ENMT:  No difficulty hearing, tinnitus, vertigo; No sinus or throat pain  RESPIRATORY: No cough, wheezing, hemoptysis, or shortness of breath  CARDIOVASCULAR: No chest pain, dyspnea, palpitations, dizziness, syncope  GASTROINTESTINAL: No abdominal  pain, nausea, vomiting, diarrhea, constipation, melena or bright red blood.  GENITOURINARY: No dysuria, nocturia, hematuria, or urinary incontinence  NEUROLOGICAL: No headaches, memory loss, slurred speech, limb weakness, loss of strength, numbness, or tremors  SKIN: No itching, burning, rashes, or lesions   ENDOCRINE: No heat or cold intolerance, or hair loss  MUSCULOSKELETAL: No joint pain or swelling, muscle, back, or extremity pain  PSYCHIATRIC: No depression, anxiety, or difficulty sleeping  HEME/LYMPH: No easy bruising or bleeding gums  ALLERY AND IMMUNOLOGIC: No hives or rash.    PHYSICAL EXAMINATION:    Constitutional: NAD, awake and alert  HEENT: PERR, EOMI,  No oral cyananosis.  Neck:  supple,  No JVD  Respiratory: Breath sounds are clear bilaterally, No wheezing, rales or rhonchi  Cardiovascular: S1 and S2, regular rate and rhythm, no Murmurs, gallops or rubs  Gastrointestinal: Bowel Sounds present, soft, nontender.   Extremities: No peripheral edema. No clubbing or cyanosis.  Vascular: 2+ peripheral pulses  Neurological: Alert and oriented to place and person, no focal deficits  Musculoskeletal: no calf tenderness.  Skin: No rashes.      LABS:                        14.2   10.91 )-----------( 221      ( 2022 12:30 )             43.8       138  |  105  |  39<H>  ----------------------------<  109<H>  3.7   |  23  |  1.70<H>    Ca    8.4<L>      2022 12:30  Phos  5.1       Mg     2.2         TPro  7.1  /  Alb  3.0<L>  /  TBili  0.6  /  DBili  x   /  AST  12<L>  /  ALT  16  /  AlkPhos  67    LIVER FUNCTIONS - ( 2022 12:30 )  Alb: 3.0 g/dL / Pro: 7.1 gm/dL / ALK PHOS: 67 U/L / ALT: 16 U/L / AST: 12 U/L / GGT: x           PT/INR - ( 2022 09:08 )   PT: 12.5 sec;   INR: 1.08 ratio         PTT - ( 2022 09:08 )  PTT:28.4 sec    Urinalysis Basic - ( 2022 10:58 )    Color: Yellow / Appearance: very cloudy / S.015 / pH: x  Gluc: x / Ketone: Negative  / Bili: Negative / Urobili: Negative   Blood: x / Protein: 100 / Nitrite: Negative   Leuk Esterase: Moderate / RBC: 0-2 /HPF / WBC >50   Sq Epi: x / Non Sq Epi: Many / Bacteria: Many          EKG: sinus rhythm 71bpm, Diana 164ms, QRS 102ms, QTc 436ms    TELEMETRY: sinus rhythm in 60-70bpm, frequent pauses in sinus rhythm longest 2.2sec in the last 24hrs

## 2022-04-25 NOTE — CONSULT NOTE ADULT - NS ATTEND AMEND GEN_ALL_CORE FT
89yo female with RUQ pain  Now resolved and nontender  imaging with ?cholecystitis and dilated bile ducts (though size of ducts similar to that from 4 years ago)  MRCP ordered - unlikely to find cbd stones given periampullary diverticulum, normal lfts and nontender currently    if clear cholecystitis, decision would be whether to proceed with cholecystectomy or cholecystomy tube per surgery
elderly admitted for cholecystitis and uti, known to have frequent PVC. ASymptomatic SND sinus pauses <3 sec. Stop bb indefinitely, no further EP work up at this time  cont' care as per primary team

## 2022-04-25 NOTE — CONSULT NOTE ADULT - TREATMENT GUIDELINE COMMENT
c/w current interventions, except no ppm, nothing else to do about pauses, maintain dnr and dni, pursue home hospice referral so family can decide to either go home with hospice or home pall.     ** Spent 30 nminutes reviewing advanced care planning, including advanced directives, MOLST, and all dispo options

## 2022-04-25 NOTE — CONSULT NOTE ADULT - REASON FOR ADMISSION
cholecystitis, bradycardia,

## 2022-04-25 NOTE — DIETITIAN INITIAL EVALUATION ADULT - PERTINENT MEDS FT
MEDICATIONS  (STANDING):  amLODIPine   Tablet 5 milliGRAM(s) Oral daily  cyanocobalamin 1000 MICROGram(s) Oral daily  dorzolamide 2%/timolol 0.5% Ophthalmic Solution 1 Drop(s) Both EYES two times a day  folic acid 1 milliGRAM(s) Oral daily  heparin   Injectable 5000 Unit(s) SubCutaneous every 8 hours  latanoprost 0.005% Ophthalmic Solution 1 Drop(s) Both EYES at bedtime  melatonin 5 milliGRAM(s) Oral at bedtime  pilocarpine 4% Solution 1 Drop(s) Both EYES two times a day  piperacillin/tazobactam IVPB.. 3.375 Gram(s) IV Intermittent every 8 hours  simvastatin 20 milliGRAM(s) Oral at bedtime    MEDICATIONS  (PRN):

## 2022-04-25 NOTE — CONSULT NOTE ADULT - ASSESSMENT
89yo female PMH HTN, breast Ca, mild dementia, admitted with abdominal pain, + UTI and ?cholecystitis on imaging, no acute indications for surgery - treated with IV hydration and abx.    Telemetry noted with episodes of pauses in sinus rhythm, per chart review longest pause 2.8sec in ED, overnight noted with multiple 1.7-2.2sec pause, otherwise sinus rhythm with no significant bradycardia or pauses >3sec, pt is asymptomatic and hemodynamically stable.    Med admin records show that she received lopressor 2.5mg IVP around 1045am and toprol XL 25mg last night 4/24 at 1630.    There are no pacing indications at this time.  will cont to monitor tele await bb washout for another 24hrs  continue to hold all av-corazon blockers  Palliative consult noted.

## 2022-04-25 NOTE — DIETITIAN INITIAL EVALUATION ADULT - ORAL NUTRITION SUPPLEMENTS
Ensure Clear TID to optimize po intake and provide an additional 240 kcal, 8g protein, 0g fat per serving.

## 2022-04-25 NOTE — CONSULT NOTE ADULT - ASSESSMENT
89y old Female with hx of dementia (FAST 6e), HTN, breast ca, admitted on 4/24 for RUQ pains. Found to have CT with + CBD dilatation unchanged from 2017., with concern for cholecystitis and +UA. Course c/b episodes of pauses on EKG in ED, and per daughter patient is DNR/DNI and does not want PPM. Palliative Care consulted to assist with establishing GOC.     1) Abd pain/kimmie  - concern for cholecystitis, in setting of chronic CBD dilation and initial pain  - no longer in pain  - surgical notes appreciated- NPO, suggesting GI cc: need for MRCP  - GI notes appreciated- chronic CBD dilation, unchanged on CT, unlikely cholecystitis more likely UTI  - MRCP only recommended if pt worsening clinically, which she is not, so this was cancelled  - family less interested in procedures, glad that this was cancelled    2) UTI  - UA positive, Cx growing gram negative rods  - ID consulted   - on IV abx, will await susceptibilities    3) Bradycardia/pauses  - family aware  - do not want PPM  - confirmed DNR, understanding that this may worsen with no procedure that they would want pt to have. Pt also able to say that she does not want any surgical procedures    4) Debility  - PPS<40%  - care coordination notes appreciated- pt lives at home with 24hr aids  - dietary notes appreciated- severe protein calorie malnutrition documented    5) Prognosis  - poor  - in setting of advanced age, advancing dementia (FAST 6e, incontinent, wears diapers), severe protein calorie malnutrition, PPS<40% (24hr aids), likely hospice eligible.     6) GOC/Advanced Directives  - pt does not have capacity for decision making, though she is able to give an opinion at times on if she wants certain interventions  - HCP on file naming 1: Venkat Peoples [phone] and 2) Magali Lindsay [phone]  - MOLST: DNR and DNI   - GOC conversation held today- plan for [facility] home hospice referral, while treating UTI. Family may either go with hospice or home with pall    Thank you for including us in Ms. Peoples's care. Will continue to follow with you.    Rianna Munguia MD  Palliative Care Attending 89y old Female with hx of dementia (FAST 6e), HTN, breast ca, admitted on 4/24 for RUQ pains. Found to have CT with + CBD dilatation unchanged from 2017., with concern for cholecystitis and +UA. Course c/b episodes of pauses on EKG in ED, and per daughter patient is DNR/DNI and does not want PPM. Palliative Care consulted to assist with establishing GOC.     1) Abd pain/kimmie  - concern for cholecystitis, in setting of chronic CBD dilation and initial pain  - no longer in pain  - surgical notes appreciated- NPO, suggesting GI cc: need for MRCP  - GI notes appreciated- chronic CBD dilation, unchanged on CT, unlikely cholecystitis more likely UTI  - MRCP only recommended if pt worsening clinically, which she is not, so this was cancelled  - family less interested in procedures, glad that this was cancelled    2) UTI  - UA positive, Cx growing gram negative rods  - ID consulted   - on IV abx, will await susceptibilities    3) Bradycardia/pauses  - family aware  - do not want PPM  - confirmed DNR, understanding that this may worsen with no procedure that they would want pt to have. Pt also able to say that she does not want any surgical procedures    4) Debility/Dementia  - PPS<40%  - care coordination notes appreciated- pt lives at home with 24hr aids  - dietary notes appreciated- severe protein calorie malnutrition documented  - dementia, mod- nearing severe FAST 6e (incontinent)--> if sundowning can consider seroquel 25mg qhs prn    5) Prognosis  - poor  - in setting of advanced age, advancing dementia (FAST 6e, incontinent, wears diapers), severe protein calorie malnutrition, PPS<40% (24hr aids), likely hospice eligible.     6) GOC/Advanced Directives  - pt does not have capacity for decision making, though she is able to give an opinion at times on if she wants certain interventions  - HCP on file naming 1: Venkat Richelle [phone] and 2) Magali Lindsay [phone]  - MOLST: DNR and DNI   - GOC conversation held today- plan for [facility] home hospice referral, while treating UTI. Family may either go with hospice or home with pall    Thank you for including us in Ms. Peoples's care. Will continue to follow with you.    Rianna Munguia MD  Palliative Care Attending

## 2022-04-26 LAB
ALBUMIN SERPL ELPH-MCNC: 2.8 G/DL — LOW (ref 3.3–5)
ALP SERPL-CCNC: 60 U/L — SIGNIFICANT CHANGE UP (ref 40–120)
ALT FLD-CCNC: 13 U/L — SIGNIFICANT CHANGE UP (ref 12–78)
ANION GAP SERPL CALC-SCNC: 11 MMOL/L — SIGNIFICANT CHANGE UP (ref 5–17)
AST SERPL-CCNC: 15 U/L — SIGNIFICANT CHANGE UP (ref 15–37)
BASOPHILS # BLD AUTO: 0.08 K/UL — SIGNIFICANT CHANGE UP (ref 0–0.2)
BASOPHILS NFR BLD AUTO: 0.8 % — SIGNIFICANT CHANGE UP (ref 0–2)
BILIRUB SERPL-MCNC: 0.5 MG/DL — SIGNIFICANT CHANGE UP (ref 0.2–1.2)
BUN SERPL-MCNC: 42 MG/DL — HIGH (ref 7–23)
CALCIUM SERPL-MCNC: 8.4 MG/DL — LOW (ref 8.5–10.1)
CHLORIDE SERPL-SCNC: 106 MMOL/L — SIGNIFICANT CHANGE UP (ref 96–108)
CO2 SERPL-SCNC: 22 MMOL/L — SIGNIFICANT CHANGE UP (ref 22–31)
CREAT SERPL-MCNC: 1.46 MG/DL — HIGH (ref 0.5–1.3)
EGFR: 32 ML/MIN/1.73M2 — LOW
EOSINOPHIL # BLD AUTO: 0.26 K/UL — SIGNIFICANT CHANGE UP (ref 0–0.5)
EOSINOPHIL NFR BLD AUTO: 2.6 % — SIGNIFICANT CHANGE UP (ref 0–6)
GLUCOSE SERPL-MCNC: 97 MG/DL — SIGNIFICANT CHANGE UP (ref 70–99)
HCT VFR BLD CALC: 41.8 % — SIGNIFICANT CHANGE UP (ref 34.5–45)
HGB BLD-MCNC: 13.7 G/DL — SIGNIFICANT CHANGE UP (ref 11.5–15.5)
IMM GRANULOCYTES NFR BLD AUTO: 0.4 % — SIGNIFICANT CHANGE UP (ref 0–1.5)
LYMPHOCYTES # BLD AUTO: 2.05 K/UL — SIGNIFICANT CHANGE UP (ref 1–3.3)
LYMPHOCYTES # BLD AUTO: 20.6 % — SIGNIFICANT CHANGE UP (ref 13–44)
MAGNESIUM SERPL-MCNC: 2.2 MG/DL — SIGNIFICANT CHANGE UP (ref 1.6–2.6)
MCHC RBC-ENTMCNC: 29.6 PG — SIGNIFICANT CHANGE UP (ref 27–34)
MCHC RBC-ENTMCNC: 32.8 GM/DL — SIGNIFICANT CHANGE UP (ref 32–36)
MCV RBC AUTO: 90.3 FL — SIGNIFICANT CHANGE UP (ref 80–100)
MONOCYTES # BLD AUTO: 1.17 K/UL — HIGH (ref 0–0.9)
MONOCYTES NFR BLD AUTO: 11.8 % — SIGNIFICANT CHANGE UP (ref 2–14)
NEUTROPHILS # BLD AUTO: 6.34 K/UL — SIGNIFICANT CHANGE UP (ref 1.8–7.4)
NEUTROPHILS NFR BLD AUTO: 63.8 % — SIGNIFICANT CHANGE UP (ref 43–77)
PHOSPHATE SERPL-MCNC: 3.6 MG/DL — SIGNIFICANT CHANGE UP (ref 2.5–4.5)
PLATELET # BLD AUTO: 201 K/UL — SIGNIFICANT CHANGE UP (ref 150–400)
POTASSIUM SERPL-MCNC: 3.6 MMOL/L — SIGNIFICANT CHANGE UP (ref 3.5–5.3)
POTASSIUM SERPL-SCNC: 3.6 MMOL/L — SIGNIFICANT CHANGE UP (ref 3.5–5.3)
PROT SERPL-MCNC: 6.8 GM/DL — SIGNIFICANT CHANGE UP (ref 6–8.3)
RBC # BLD: 4.63 M/UL — SIGNIFICANT CHANGE UP (ref 3.8–5.2)
RBC # FLD: 13.9 % — SIGNIFICANT CHANGE UP (ref 10.3–14.5)
SODIUM SERPL-SCNC: 139 MMOL/L — SIGNIFICANT CHANGE UP (ref 135–145)
WBC # BLD: 9.94 K/UL — SIGNIFICANT CHANGE UP (ref 3.8–10.5)
WBC # FLD AUTO: 9.94 K/UL — SIGNIFICANT CHANGE UP (ref 3.8–10.5)

## 2022-04-26 PROCEDURE — 99232 SBSQ HOSP IP/OBS MODERATE 35: CPT

## 2022-04-26 RX ADMIN — PIPERACILLIN AND TAZOBACTAM 25 GRAM(S): 4; .5 INJECTION, POWDER, LYOPHILIZED, FOR SOLUTION INTRAVENOUS at 05:30

## 2022-04-26 RX ADMIN — DORZOLAMIDE HYDROCHLORIDE TIMOLOL MALEATE 1 DROP(S): 20; 5 SOLUTION/ DROPS OPHTHALMIC at 22:05

## 2022-04-26 RX ADMIN — Medication 1 DROP(S): at 10:25

## 2022-04-26 RX ADMIN — SIMVASTATIN 20 MILLIGRAM(S): 20 TABLET, FILM COATED ORAL at 22:03

## 2022-04-26 RX ADMIN — DORZOLAMIDE HYDROCHLORIDE TIMOLOL MALEATE 1 DROP(S): 20; 5 SOLUTION/ DROPS OPHTHALMIC at 10:25

## 2022-04-26 RX ADMIN — PREGABALIN 1000 MICROGRAM(S): 225 CAPSULE ORAL at 10:26

## 2022-04-26 RX ADMIN — PIPERACILLIN AND TAZOBACTAM 25 GRAM(S): 4; .5 INJECTION, POWDER, LYOPHILIZED, FOR SOLUTION INTRAVENOUS at 22:00

## 2022-04-26 RX ADMIN — HEPARIN SODIUM 5000 UNIT(S): 5000 INJECTION INTRAVENOUS; SUBCUTANEOUS at 22:04

## 2022-04-26 RX ADMIN — Medication 1 DROP(S): at 22:05

## 2022-04-26 RX ADMIN — HEPARIN SODIUM 5000 UNIT(S): 5000 INJECTION INTRAVENOUS; SUBCUTANEOUS at 05:31

## 2022-04-26 RX ADMIN — LATANOPROST 1 DROP(S): 0.05 SOLUTION/ DROPS OPHTHALMIC; TOPICAL at 22:05

## 2022-04-26 RX ADMIN — Medication 1 MILLIGRAM(S): at 10:26

## 2022-04-26 RX ADMIN — HEPARIN SODIUM 5000 UNIT(S): 5000 INJECTION INTRAVENOUS; SUBCUTANEOUS at 14:09

## 2022-04-26 RX ADMIN — Medication 5 MILLIGRAM(S): at 22:03

## 2022-04-26 RX ADMIN — PIPERACILLIN AND TAZOBACTAM 25 GRAM(S): 4; .5 INJECTION, POWDER, LYOPHILIZED, FOR SOLUTION INTRAVENOUS at 14:10

## 2022-04-26 NOTE — PROGRESS NOTE ADULT - ASSESSMENT
89 y/o female with h/o HTN, [place], low back pain, breast CA, [place], mild dementia was admitted on 4/24 for severe RUQ pain for the last 3 days PTA. The patient has a private aid who helps her. In ER, the patient had frequent PVC's and 2.8 second pause in heart monitor. Abd US showed cholecystitis, and the patient had surgical consult in the ED. The patient had a DNR at home and the patient daughter/ HCP reaffirmed the DNR /DNI, gave verbal consent. She also does not want to have a PPM. In ER she received zosyn.     1. Acute cholecystitis. Pyuria. UTI with GNR. Bradycardia.   -encephalopathy  -mild leukocytosis  -BC x 2, urine c/s noted  -noted with CBD dilatation  -on zosyn 3.375 gm IV q9h # 2  -tolerating abx well so far; no side effects noted  -monitor abdomen  -continue abx coverage  -f/u cultures  -monitor temps  -f/u CBC  -supportive care  2. Other issues:   -care per medicine

## 2022-04-26 NOTE — CHART NOTE - NSCHARTNOTEFT_GEN_A_CORE
HPI:  HPI: The patient is a 89 yo female wiht Hx. of HTN, [place],  Low back pain, breast CA,[place], mild dementia  who presented in the ED for sever RUQ pain for the last 3 days.        (24 Apr 2022 17:52)      PERTINENT PMH REVIEWED:  [ X ] YES [ ] NO           Primary Contact:    HCP on file naming 1: Venkat Peoples [phone] and 2) Magali Lindsay [phone]                 HCP [ X ] Surrogate [   ] Guardian [   ]    Mental Status: pt does not have capacity for decision making  Concerns of Depression [  ]- not identified   Anxiety [   ]- not identified   Baseline ADLs (prior to admission):  Independent [ ] moderately [ ] fully   Dependent   [ ] moderately [X ] fully    Family Meeting: Took place yesterday with Dr. Munguia, plan for Home hospice referral     Anticipated Grief: Patient [  ] Family [ X ]    Caregiver Gary Assessed: Yes [ X  ] No [  ]    Quaker: Oriental orthodox     Spiritual Concerns: Not identified     Goals of Care: Comfort [  X ] Rehabilitation [  ] Curative [  ] Life Prolonging [  ]    Previous Services: 24/7 private hire aide    ADVANCE DIRECTIVES:  MOLST: DNR/DNI    Anticipated D/C Plan: Home with 24/7 aides and [facility] home hospice                      Summary:    This SW spoke with both pts son Venkat and daughter Magali via phone (separately). Both of pts children discussed how they came together as a family and decided that focusing on pts comfort with home hospice would be the best option. They originally were interested in [facility] but after speaking to friends who have used [facility] they have chosen to have a referral sent to [facility] for home hospice. Floor SW [facility] aware and will place referral.    Pts son shared that he has seen pts progression therefore, they are prepared the best they can for this next stage. He discussed how his Grandmother lived to 89 and he saw a similar process with her. Feelings explored and support provided.    Plan at this time is for pt. to go home with [facility] and private hire 24/7 aides. Emotional support provided. Our team will continue to follow.

## 2022-04-26 NOTE — PROGRESS NOTE ADULT - ASSESSMENT
89 year old woman with HTN, hearing impairment, hx of breast cancer, cognitive impairment, presented with RUQ pain x 3 days, found to have biliary sludge and stones as well as positive sonographic Romero's. LFTs normal, bili WNL. Recommended MRCP for further evaluation however family wishes for conservative management, trial of IV antibiotics, no interventions.     Cholelithiasis with possible acute cholecystitis  Unable to definitively r/o acute cholecystitis. No hyperbilirubinnemia. Lipase normal. LFTs normal. On Zosyn. Tolerating PO. Patient and family would not be inclined to proceed with cholecystostomy drain if suggested by additional workup. Wish to just take her home with trial of antibiotics.     Possible UTI vs asymptomatic bacteriuria  UCx with > 100,000 CFU/mL GNRs. On Zosyn. F/u in process Ucx.     Asymptomatic bradycardia  Patient / family declined PPM placement if offered. No need for PPM at this time as per EP. Now NSR, normal rate, no further pauses.     Mild Dementia  Reorient frequently    HTN  BP controlled    Severe protein calorie malnutrition  Nutrition input appreciated. Encourage po. Trend wt      Dispo: DNR DNI. Referral for home with palliative care

## 2022-04-27 PROCEDURE — 99233 SBSQ HOSP IP/OBS HIGH 50: CPT

## 2022-04-27 PROCEDURE — 99232 SBSQ HOSP IP/OBS MODERATE 35: CPT

## 2022-04-27 RX ORDER — CHLORHEXIDINE GLUCONATE 213 G/1000ML
1 SOLUTION TOPICAL
Refills: 0 | Status: DISCONTINUED | OUTPATIENT
Start: 2022-04-27 | End: 2022-04-28

## 2022-04-27 RX ADMIN — PREGABALIN 1000 MICROGRAM(S): 225 CAPSULE ORAL at 09:04

## 2022-04-27 RX ADMIN — HEPARIN SODIUM 5000 UNIT(S): 5000 INJECTION INTRAVENOUS; SUBCUTANEOUS at 14:24

## 2022-04-27 RX ADMIN — Medication 1 DROP(S): at 09:05

## 2022-04-27 RX ADMIN — AMLODIPINE BESYLATE 5 MILLIGRAM(S): 2.5 TABLET ORAL at 09:05

## 2022-04-27 RX ADMIN — LATANOPROST 1 DROP(S): 0.05 SOLUTION/ DROPS OPHTHALMIC; TOPICAL at 21:16

## 2022-04-27 RX ADMIN — HEPARIN SODIUM 5000 UNIT(S): 5000 INJECTION INTRAVENOUS; SUBCUTANEOUS at 21:16

## 2022-04-27 RX ADMIN — HEPARIN SODIUM 5000 UNIT(S): 5000 INJECTION INTRAVENOUS; SUBCUTANEOUS at 05:49

## 2022-04-27 RX ADMIN — SIMVASTATIN 20 MILLIGRAM(S): 20 TABLET, FILM COATED ORAL at 21:17

## 2022-04-27 RX ADMIN — PIPERACILLIN AND TAZOBACTAM 25 GRAM(S): 4; .5 INJECTION, POWDER, LYOPHILIZED, FOR SOLUTION INTRAVENOUS at 05:49

## 2022-04-27 RX ADMIN — PIPERACILLIN AND TAZOBACTAM 25 GRAM(S): 4; .5 INJECTION, POWDER, LYOPHILIZED, FOR SOLUTION INTRAVENOUS at 21:15

## 2022-04-27 RX ADMIN — PIPERACILLIN AND TAZOBACTAM 25 GRAM(S): 4; .5 INJECTION, POWDER, LYOPHILIZED, FOR SOLUTION INTRAVENOUS at 14:23

## 2022-04-27 RX ADMIN — Medication 1 DROP(S): at 21:16

## 2022-04-27 RX ADMIN — Medication 5 MILLIGRAM(S): at 21:17

## 2022-04-27 RX ADMIN — DORZOLAMIDE HYDROCHLORIDE TIMOLOL MALEATE 1 DROP(S): 20; 5 SOLUTION/ DROPS OPHTHALMIC at 09:05

## 2022-04-27 RX ADMIN — Medication 1 MILLIGRAM(S): at 09:04

## 2022-04-27 RX ADMIN — DORZOLAMIDE HYDROCHLORIDE TIMOLOL MALEATE 1 DROP(S): 20; 5 SOLUTION/ DROPS OPHTHALMIC at 21:16

## 2022-04-27 NOTE — PROGRESS NOTE ADULT - NUTRITIONAL ASSESSMENT
This patient has been assessed with a concern for Malnutrition and has been determined to have a diagnosis/diagnoses of Severe protein-calorie malnutrition.    This patient is being managed with:   Diet DASH/TLC-  Sodium & Cholesterol Restricted  Entered: Apr 24 2022  1:51PM    

## 2022-04-27 NOTE — SWALLOW BEDSIDE ASSESSMENT ADULT - SLP GENERAL OBSERVATIONS
pt seated in her chair, awake and alert with interactive eye gaze: verbally interactive and able to converse but communication is compromised by hearing loss..  No evidence of primary linguistic pathology or of motor speech deficits. Motor speech mildly imprecise because of loss of lateral dentition.  Pt minimized the episode this morning, but dtr explained that it happens at home where she will have an episode of not swallowing and then tanvir not eat anything more.. In response to questions, pt and dtr described that it "does not go down", pointint to the base of her throat and to the sternum. it is worse with solid food than with liquid; denied regurgitation.

## 2022-04-27 NOTE — SWALLOW BEDSIDE ASSESSMENT ADULT - PHARYNGEAL PHASE
onset of pharyngeal swallow within age-appropriate time limits, with observable laryngeal lift on initiation. No overt s/s aspiration on swallow./Within functional limits

## 2022-04-27 NOTE — PROGRESS NOTE ADULT - ASSESSMENT
89y old Female with hx of dementia (FAST 6e), HTN, breast ca, admitted on 4/24 for RUQ pains. Found to have CT with + CBD dilatation unchanged from 2017., with concern for cholecystitis and +UA. Course c/b episodes of pauses on EKG in ED, and per daughter patient is DNR/DNI and does not want PPM. Palliative Care consulted to assist with establishing GOC.     1) Abd pain/kimmie  - concern for cholecystitis, in setting of chronic CBD dilation and initial pain  - no longer in pain  - surgical notes appreciated- NPO, suggesting GI cc: need for MRCP  - GI notes appreciated- chronic CBD dilation, unchanged on CT, unlikely cholecystitis more likely UTI  - MRCP only recommended if pt worsening clinically, which she is not, so this was cancelled  - family less interested in procedures, glad that this was cancelled    2) UTI  - UA positive, Cx growing gram negative rods  - ID consulted   - on IV abx, will await susceptibilities    3) Bradycardia/pauses  - family aware  - do not want PPM  - confirmed DNR, understanding that this may worsen with no procedure that they would want pt to have. Pt also able to say that she does not want any surgical procedures    4) Debility/Dementia  - PPS<40%  - care coordination notes appreciated- pt lives at home with 24hr aids  - dietary notes appreciated- severe protein calorie malnutrition documented  - dementia, mod- nearing severe FAST 6e (incontinent)--> if sundowning can consider seroquel 25mg qhs prn    5) Prognosis  - poor  - in setting of advanced age, advancing dementia (FAST 6e, incontinent, wears diapers), severe protein calorie malnutrition, PPS<40% (24hr aids), likely hospice eligible.     6) GOC/Advanced Directives  - pt does not have capacity for decision making, though she is able to give an opinion at times on if she wants certain interventions  - HCP on file naming 1: Venkat Peoples [phone] and 2) Magali Lindsay [phone]  - MOLST: DNR and DNI   - GOC conversation held today- plan for [facility] home hospice referral, while treating UTI, [facility] could not accommodate request and family asked for [facility] referral, placed, awaiting outcome. Family may either go with hospice or home with pall pending outcome of referral.     Thank you for including us in Ms. Richelle's care. Will continue to follow with you.    Rianna Munguia MD  Palliative Care Attending

## 2022-04-27 NOTE — SWALLOW BEDSIDE ASSESSMENT ADULT - SWALLOW EVAL: RECOMMENDED DIET
maintain regular consistency: long discussion with Pt and dtr re: strategies fro reducing esophageal symptoms.

## 2022-04-27 NOTE — SWALLOW BEDSIDE ASSESSMENT ADULT - ESOPHAGEAL PHASE
Almost immediately after drinking water in SUCCESSIVE MULTIPLE SWALLOWS (against directive), Pt showed symptoms of regurgitation, and nearly threw up, and clearly had some discomfort. she commented " I can feel it right here", pointing to her sternum.:  she later had an episode of burping and then outright belched.  She reported that the problem is worse with food rather than with liquid (though note this occurred with liquid). These signs and symptoms are c/w an esophageal dysphagia than with oral-pharyngeal.  Pt is without some dentition so mastication is compromised to some extent..

## 2022-04-27 NOTE — SWALLOW BEDSIDE ASSESSMENT ADULT - SWALLOW EVAL: DIAGNOSIS
presbyphagia for regular consistency diet with choice of softer options as mastication is mildly compromised by lack of lateral dentition bilaterally in the mandible.  In addition, pt's symptoms, as explained, and signs, as observed, suggest an esophageal dysphagia. Suggest GI consult.

## 2022-04-27 NOTE — SWALLOW BEDSIDE ASSESSMENT ADULT - ORAL PHASE
immediate bolus formation; AP transfer for thin and for puree WNL. mastication mildly dyssynchronous 2/2 lack of lateral mandibular dentition. Able haowever to collect and recollect and make lingual sweep for oral clearance../Within functional limits

## 2022-04-27 NOTE — SWALLOW BEDSIDE ASSESSMENT ADULT - SWALLOW EVAL: RECOMMENDED FEEDING/EATING TECHNIQUES
allow for swallow between intakes/alternate food with liquid/hard swallow w/ each bite or sip/maintain upright posture during/after eating for 30 mins/position upright (90 degrees)/small sips/bites

## 2022-04-27 NOTE — PROGRESS NOTE ADULT - ASSESSMENT
89 year-old woman with HTN, hearing impairment, hx of breast cancer, cognitive impairment, presented with RUQ pain x 3 days, found to have biliary sludge and stones as well as positive sonographic Romero's. LFTs normal, bili WNL. Recommended MRCP for further evaluation however patient and family deferred MRCP, requested conservative management, that being a trial of antibiotics and observation. No interventions including cholecystostomy drain placement. She was also noted to have positive urine culture, in process. On Zosyn and clinically stable.     Cholelithiasis with possible acute cholecystitis  Unable to definitively r/o acute cholecystitis. Reassuringly, no fever, no hyperbilirubinnemia, LFTs normal. On Zosyn, day 4. Tolerating PO. Patient and family would not be inclined to proceed with cholecystostomy drain if suggested by additional workup. Wish to just take her home with trial of antibiotics and observation.   - Continue Zosyn for now, plan to switch to oral antibiotic regimen such as cephalosporin plus flagyl upon discharge (pending the urine culture results)    Possible UTI vs asymptomatic bacteriuria  UCx with > 100,000 CFU/mL GNRs. On Zosyn. F/u in-process Ucx.     Asymptomatic bradycardia  No need for PPM at this time as per EP. Now NSR, normal rate, no further pauses.     Mild dementia  Reorient frequently    HTN  BP controlled    Severe protein calorie malnutrition  Nutrition input appreciated. Encourage po. Trend wt      Code status: DNR DNI.   Dispo: Anticipate discharge to home with hospice. Plan to discharge tomorrow if urine culture susceptibility testing results are back and favorable.

## 2022-04-27 NOTE — SWALLOW BEDSIDE ASSESSMENT ADULT - COMMENTS
Admitted with: cholecystitis, bradycardia,  89 yo female with Hx. of HTN, [place],  Low back pain, breast CA, mild dementia  who presented in the ED for sever RUQ pain for the last 3 days. The patient has a private aide  who helps with the Hx. The patient daughter also arrived from [place]. The patient also had frequent PVC's and 2.8 second pause in heart monitor while in the ED.  Abd US showed cholecystitis, and the patient had surgical consult in the ED. The patient had a DNR at home and the patient daughter who is her HCP reaffirmed the DNR /DNI, gave verbal consent. She also does not want to have a PPM.   Pt had an episode this morning of "swallowing difficulty"   Pt is now seen by Service for evaluation of swallow function.  DTr present at session and provided hx and information.  Pt is very [place], and needed repetition of  information and questions at times.    PMHx: HTN, Lumbago, Breast CA, [place],     PSHx: R mastectomy, colon CA, back and hip surgeries.

## 2022-04-27 NOTE — SWALLOW BEDSIDE ASSESSMENT ADULT - ASR SWALLOW DENTITION
lower incisors are natural and intact; bilaerally mandibular pre-molars and molars are absent./upper dentures

## 2022-04-27 NOTE — PROGRESS NOTE ADULT - ASSESSMENT
89 y/o female with h/o HTN, [place], low back pain, breast CA, [place], mild dementia was admitted on 4/24 for severe RUQ pain for the last 3 days PTA. The patient has a private aid who helps her. In ER, the patient had frequent PVC's and 2.8 second pause in heart monitor. Abd US showed cholecystitis, and the patient had surgical consult in the ED. The patient had a DNR at home and the patient daughter/ HCP reaffirmed the DNR /DNI, gave verbal consent. She also does not want to have a PPM. In ER she received zosyn.     1. Acute cholecystitis. Pyuria. UTI with GNR. Bradycardia.   -encephalopathy  -mild leukocytosis  -BC x 2, urine c/s noted  -noted with CBD dilatation  -on zosyn 3.375 gm IV q9h # 3  -tolerating abx well so far; no side effects noted  -monitor abdomen  -continue abx coverage for now; plan to downgrade abx therapy pending culture results  -f/u cultures  -monitor temps  -f/u CBC  -supportive care  2. Other issues:   -care per medicine

## 2022-04-27 NOTE — SWALLOW BEDSIDE ASSESSMENT ADULT - NS SPL SWALLOW CLINIC TRIAL FT
pt presents with no oral-pharyngeal contraindication for regular consistency diet. Disc with pt and dtr:  Sit upright at the table to eat and stay upright for 20-30 minutes after eating.  Alternate solid and liquid.  Drink in single small sips with breathing in between.  Choose softer moister options within the regualr category.  Straw OK. no more than 1-2 suck swallow cycles on one breath hold.   Suggest GI consult.   Call to Dr. Macdonald.

## 2022-04-28 ENCOUNTER — TRANSCRIPTION ENCOUNTER (OUTPATIENT)
Age: 87
End: 2022-04-28

## 2022-04-28 VITALS
SYSTOLIC BLOOD PRESSURE: 127 MMHG | HEART RATE: 67 BPM | RESPIRATION RATE: 16 BRPM | TEMPERATURE: 98 F | DIASTOLIC BLOOD PRESSURE: 61 MMHG | OXYGEN SATURATION: 96 %

## 2022-04-28 PROCEDURE — 99239 HOSP IP/OBS DSCHRG MGMT >30: CPT

## 2022-04-28 PROCEDURE — 99233 SBSQ HOSP IP/OBS HIGH 50: CPT

## 2022-04-28 RX ORDER — DORZOLAMIDE HYDROCHLORIDE TIMOLOL MALEATE 20; 5 MG/ML; MG/ML
1 SOLUTION/ DROPS OPHTHALMIC
Qty: 10 | Refills: 3
Start: 2022-04-28 | End: 2022-08-25

## 2022-04-28 RX ORDER — AMLODIPINE BESYLATE 2.5 MG/1
1 TABLET ORAL
Qty: 30 | Refills: 0
Start: 2022-04-28

## 2022-04-28 RX ORDER — PILOCARPINE HCL 4 %
1 DROPS OPHTHALMIC (EYE)
Qty: 15 | Refills: 3
Start: 2022-04-28

## 2022-04-28 RX ORDER — CEFUROXIME AXETIL 250 MG
1 TABLET ORAL
Qty: 10 | Refills: 0
Start: 2022-04-28 | End: 2022-05-02

## 2022-04-28 RX ORDER — DORZOLAMIDE HYDROCHLORIDE TIMOLOL MALEATE 20; 5 MG/ML; MG/ML
1 SOLUTION/ DROPS OPHTHALMIC
Qty: 0 | Refills: 0 | DISCHARGE

## 2022-04-28 RX ORDER — OXYBUTYNIN CHLORIDE 5 MG
1 TABLET ORAL
Qty: 0 | Refills: 0 | DISCHARGE

## 2022-04-28 RX ORDER — METOPROLOL TARTRATE 50 MG
0.5 TABLET ORAL
Qty: 0 | Refills: 0 | DISCHARGE

## 2022-04-28 RX ORDER — ERYTHROMYCIN BASE 5 MG/GRAM
1 OINTMENT (GRAM) OPHTHALMIC (EYE)
Qty: 0 | Refills: 0 | DISCHARGE

## 2022-04-28 RX ORDER — METRONIDAZOLE 500 MG
1 TABLET ORAL
Qty: 15 | Refills: 0
Start: 2022-04-28 | End: 2022-05-02

## 2022-04-28 RX ORDER — LATANOPROST 0.05 MG/ML
1 SOLUTION/ DROPS OPHTHALMIC; TOPICAL
Qty: 0 | Refills: 0 | DISCHARGE

## 2022-04-28 RX ORDER — PILOCARPINE HCL 4 %
1 DROPS OPHTHALMIC (EYE)
Qty: 0 | Refills: 0 | DISCHARGE

## 2022-04-28 RX ORDER — LATANOPROST 0.05 MG/ML
1 SOLUTION/ DROPS OPHTHALMIC; TOPICAL
Qty: 2.5 | Refills: 3
Start: 2022-04-28

## 2022-04-28 RX ADMIN — Medication 1 DROP(S): at 10:13

## 2022-04-28 RX ADMIN — PIPERACILLIN AND TAZOBACTAM 25 GRAM(S): 4; .5 INJECTION, POWDER, LYOPHILIZED, FOR SOLUTION INTRAVENOUS at 13:21

## 2022-04-28 RX ADMIN — DORZOLAMIDE HYDROCHLORIDE TIMOLOL MALEATE 1 DROP(S): 20; 5 SOLUTION/ DROPS OPHTHALMIC at 10:14

## 2022-04-28 RX ADMIN — AMLODIPINE BESYLATE 5 MILLIGRAM(S): 2.5 TABLET ORAL at 10:13

## 2022-04-28 RX ADMIN — CHLORHEXIDINE GLUCONATE 1 APPLICATION(S): 213 SOLUTION TOPICAL at 10:14

## 2022-04-28 RX ADMIN — PREGABALIN 1000 MICROGRAM(S): 225 CAPSULE ORAL at 10:13

## 2022-04-28 RX ADMIN — HEPARIN SODIUM 5000 UNIT(S): 5000 INJECTION INTRAVENOUS; SUBCUTANEOUS at 13:19

## 2022-04-28 RX ADMIN — HEPARIN SODIUM 5000 UNIT(S): 5000 INJECTION INTRAVENOUS; SUBCUTANEOUS at 05:22

## 2022-04-28 RX ADMIN — Medication 1 MILLIGRAM(S): at 10:13

## 2022-04-28 RX ADMIN — PIPERACILLIN AND TAZOBACTAM 25 GRAM(S): 4; .5 INJECTION, POWDER, LYOPHILIZED, FOR SOLUTION INTRAVENOUS at 05:22

## 2022-04-28 NOTE — DISCHARGE NOTE NURSING/CASE MANAGEMENT/SOCIAL WORK - PATIENT PORTAL LINK FT
You can access the FollowMyHealth Patient Portal offered by [facility] by registering at the following website: [email or web address]. By joining [facility]’s FollowMyHealth portal, you will also be able to view your health information using other applications (apps) compatible with our system.

## 2022-04-28 NOTE — DISCHARGE NOTE PROVIDER - NSDCCPCAREPLAN_GEN_ALL_CORE_FT
PRINCIPAL DISCHARGE DIAGNOSIS  Diagnosis: Cholecystitis  Assessment and Plan of Treatment: You presented to the hospital for right upper quadrant pain, since resolved. Abdominal ultrasound was notable for gallstones and sludge, pain triggered with sono probe pressure on the gallbladder. MRCP / HIDA was recommended definitively rule in/out acute cholecystitis however patient/family preference was for conservative management with antibiotics and obserbation rather than invasive procesures such as cholecystostomy drain. Reassuringly, no fever, tolerating diet, normal bilirubin level, normal liver function tests. You were treated with Zosyn, 5 days while in the hospital. Stable for discharge home with palliative care plan, antibiotics Ceftin and Flagyl to complete 5 more days. [facility] with [facility] upon returning home.      SECONDARY DISCHARGE DIAGNOSES  Diagnosis: Urinary tract infection  Assessment and Plan of Treatment: Versus asymptomatic bacteriuria. Urine culture was positive. You completed 5 days of antibiotics with Zosyn during this hospitalization, highly likely treated the infection completely. No genitourinary complaints at this point.    Diagnosis: Bradycardia  Assessment and Plan of Treatment: During the hospitalization you had periods of slow heart rate and rare, brief pause. Your metoprolol was stopped and this issue resolved thereafter. Heart rhthym is normal and rate is normal. No further pauses. You were seen and evaluated by a cardiac electrophysiologist, no need for permanent pacemaker placement. Note that in place of the metoprolol, you were started on amlodipine for blood pressure control.    Diagnosis: Severe protein-calorie malnutrition  Assessment and Plan of Treatment: Nutrition input appreciated. Liberalized diet. Encourage intake. Trend weright    Diagnosis: Potential for cognitive impairment  Assessment and Plan of Treatment: Likely underlying cognitive impairment. Re-orient frequently. Maintain regular bowel movement. Manage pain when present.

## 2022-04-28 NOTE — PROGRESS NOTE ADULT - REASON FOR ADMISSION
cholecystitis, bradycardia,
RUQ abdominal pain
cholecystitis, bradycardia,

## 2022-04-28 NOTE — PROGRESS NOTE ADULT - ASSESSMENT
89 y/o female with h/o HTN, [place], low back pain, breast CA, [place], mild dementia was admitted on 4/24 for severe RUQ pain for the last 3 days PTA. The patient has a private aid who helps her. In ER, the patient had frequent PVC's and 2.8 second pause in heart monitor. Abd US showed cholecystitis, and the patient had surgical consult in the ED. The patient had a DNR at home and the patient daughter/ HCP reaffirmed the DNR /DNI, gave verbal consent. She also does not want to have a PPM. In ER she received zosyn.     1. Acute cholecystitis. Pyuria. UTI with GNR. Bradycardia.   -encephalopathy improving  -mild leukocytosis  -BC x 2, urine c/s noted  -noted with CBD dilatation  -on zosyn 3.375 gm IV q9h # 4  -tolerating abx well so far; no side effects noted  -monitor abdomen  -urine culture results are still pending  -continue abx coverage for now; plan to downgrade abx therapy pending culture results  -f/u cultures  -monitor temps  -f/u CBC  -supportive care  2. Other issues:   -care per medicine

## 2022-04-28 NOTE — DISCHARGE NOTE NURSING/CASE MANAGEMENT/SOCIAL WORK - NSDCVIVACCINE_GEN_ALL_CORE_FT
Tdap; 01-Nov-2019 10:25; Marianna Miller (RN); [employer]; [id number] (Exp. Date: 22-Oct-2021); IntraMuscular; Deltoid Right.; 0.5 milliLiter(s); VIS (VIS Published: 09-May-2013, VIS Presented: 01-Nov-2019);   Tdap; 23-Jul-2020 08:50; Key Pollock (GISELA); [employer]; [id number] (Exp. Date: 04-Apr-2022); IntraMuscular; Deltoid Left.; 0.5 milliLiter(s); VIS (VIS Published: 09-May-2013, VIS Presented: 23-Jul-2020);

## 2022-04-28 NOTE — PROGRESS NOTE ADULT - SUBJECTIVE AND OBJECTIVE BOX
Date of service: 22 @ 09:31    Sitting in bed in NAD  Alert and confused  Tolerating PO intake    ROS: no fever or chills; poorly verbal    MEDICATIONS  (STANDING):  amLODIPine   Tablet 5 milliGRAM(s) Oral daily  chlorhexidine 4% Liquid 1 Application(s) Topical <User Schedule>  cyanocobalamin 1000 MICROGram(s) Oral daily  dorzolamide 2%/timolol 0.5% Ophthalmic Solution 1 Drop(s) Both EYES two times a day  folic acid 1 milliGRAM(s) Oral daily  heparin   Injectable 5000 Unit(s) SubCutaneous every 8 hours  latanoprost 0.005% Ophthalmic Solution 1 Drop(s) Both EYES at bedtime  melatonin 5 milliGRAM(s) Oral at bedtime  pilocarpine 4% Solution 1 Drop(s) Both EYES two times a day  piperacillin/tazobactam IVPB.. 3.375 Gram(s) IV Intermittent every 8 hours  simvastatin 20 milliGRAM(s) Oral at bedtime    Vital Signs Last 24 Hrs  T(C): 36.4 (2022 08:01), Max: 36.6 (2022 19:49)  T(F): 97.6 (2022 08:01), Max: 97.8 (2022 19:49)  HR: 67 (2022 08:01) (67 - 76)  BP: 127/61 (2022 08:01) (112/59 - 127/61)  BP(mean): --  RR: 16 (2022 08:01) (16 - 16)  SpO2: 96% (2022 08:01) (95% - 96%)     Physical exam:    Constitutional:  No acute distress  HEENT: NC/AT, EOMI, PERRLA, conjunctivae clear; ears and nose atraumatic  Neck: supple; thyroid not palpable  Back: no tenderness  Respiratory: respiratory effort normal; clear to auscultation  Cardiovascular: S1S2 regular, no murmurs  Abdomen: soft, not tender, not distended, positive BS  Genitourinary: no suprapubic tenderness  Lymphatic: no LN palpable  Musculoskeletal: no muscle tenderness, no joint swelling or tenderness  Extremities: no pedal edema  Neurological/ Psychiatric: confused, moving all extremities  Skin: no rashes; no palpable lesions    Labs: reviewed                        13.7   9.94  )-----------( 201      ( 2022 07:05 )             41.8     04-    139  |  106  |  42<H>  ----------------------------<  97  3.6   |  22  |  1.46<H>    Ca    8.4<L>      2022 07:05  Phos  3.6     04-  Mg     2.2         TPro  6.8  /  Alb  2.8<L>  /  TBili  0.5  /  DBili  x   /  AST  15  /  ALT  13  /  AlkPhos  60                          14.2   10.91 )-----------( 221      ( 2022 12:30 )             43.8     04-24    142  |  111<H>  |  24<H>  ----------------------------<  86  4.3   |  23  |  1.00    Ca    9.0      2022 09:07  Mg     2.2     -    TPro  7.1  /  Alb  3.1<L>  /  TBili  0.5  /  DBili  x   /  AST  17  /  ALT  17  /  AlkPhos  71  04-24     LIVER FUNCTIONS - ( 2022 09:07 )  Alb: 3.1 g/dL / Pro: 7.1 gm/dL / ALK PHOS: 71 U/L / ALT: 17 U/L / AST: 17 U/L / GGT: x           Urinalysis Basic - ( 2022 10:58 )    Color: Yellow / Appearance: very cloudy / S.015 / pH: x  Gluc: x / Ketone: Negative  / Bili: Negative / Urobili: Negative   Blood: x / Protein: 100 / Nitrite: Negative   Leuk Esterase: Moderate / RBC: 0-2 /HPF / WBC >50   Sq Epi: x / Non Sq Epi: Many / Bacteria: Many    COVID-19 PCR: NotDetec (22 @ 06:45)      Culture - Blood (collected 2022 10:58)  Source: .Blood None  Preliminary Report (2022 15:01):    No growth to date.    Culture - Urine (collected 2022 10:58)  Source: Clean Catch None  Preliminary Report (2022 10:29):    >100,000 CFU/ml Gram Negative Rods    Culture - Blood (collected 2022 10:55)  Source: .Blood None  Preliminary Report (2022 15:01):    No growth to date.    Radiology: all available radiological tests reviewed    < from: CT Abdomen and Pelvis w/ IV Cont (22 @ 11:28) >  1.  No acute abdominal/pelvic pathology or primary etiology of pain.  2.  Unchanged intra-/extra hepatic biliary duct dilatation to the level   of a periampullary duodenal diverticulum.  3.  Eccentric urinary bladder mural thickening and mucosal hyperemia,   unchanged.   < end of copied text >      Advanced directives addressed: full resuscitation
CHIEF COMPLAINT: RUQ abdominal pain    SUBJECTIVE: Pain resolved. No dizziness, nausea, vomiting, abdominal pain, chest pain, SOB, fever. Limited in the setting of underlying dementia    SIGNIFICANT INTERVAL EVENTS/OVERNIGHT EVENTS:  -ED noticed to have pause x 1-> patient verbalized not wanting pacemaker. Unclear of patient  -Telemetry on 4/25 with more frequent more prolonged pauses. Discussed with patient and daughter (Alanna/2nd HCP) at bedside. Patient AAOx1-2 however partially to circumstance includes age and desire not to be heroic in medical efforts. As such patient verbalized no desire for pacemaker and/or transcutaneous pacing to maintain adequate HR. Still would like EP evaluation for underlyign overall impression.     Review of Systems: 14 Point review of systems reviewed and reported as negative unless otherwise stated in HPI however also limited in the setting of underlying dementia    FROM H&P:  "HPI: The patient is a 89 yo female wiht Hx. of HTN, [place],  Low back pain, breast CA,[place], mild dementia  who presented in the ED for sever RUQ pain for the last 3 days. The patient has a private aid  who helps with the Hx. The patient daughter also arrived from [place]. The patient also had frequent PVC's and 2.8 second pause in heart monitor while in the ED.  Abd US showed cholecystitis, and the patient had surgical consult in the ED. The patient had a DNR at home and the patient daughter who is her HCP reaffirmed the DNR /DNI, gave verbal consent. She also does not want to have a PPM. "    PHYSICAL EXAM:    T(C): 36.5 (04-25-22 @ 10:41), Max: 36.9 (04-24-22 @ 15:56)  HR: 62 (04-25-22 @ 10:41) (62 - 89)  BP: 126/54 (04-25-22 @ 10:41) (126/54 - 187/76)  RR: 18 (04-25-22 @ 10:41) (18 - 18)  SpO2: 95% (04-25-22 @ 10:41) (92% - 97%)    General: AAOx1-2; NAD; Frail  Head: AT/NC  ENT: Moist Mucous Membranes; No Injury  Eyes: EOMI; PERRL  Neck: Non-tender; No JVD  CVS: Bradycardia; Regular Rythym; S1&S2, No murmur, No edema  Respiratory: Lungs CTA B/L; Normal Respiratory Effort  Abdomen/GI: Soft, non-tender, non-distended, no guarding, no rebound, normal bowel sounds  : No bladder distention, No Villegas  Extremities: No cyanosis, No clubbing, No edema  MSK: No CVA tenderness, Normal ROM, Muscle atrophy No injury  Neuro: AAOx1-2, CNII-XII grossly intact (Baseline bilateral [place]), non-focal  Psych: Appropriate, Cooperative,   Skin: Clean, Dry and Intact      LABS:                          14.5   10.86 )-----------( 190      ( 24 Apr 2022 09:07 )             46.6     04-24    142  |  111<H>  |  24<H>  ----------------------------<  86  4.3   |  23  |  1.00    Ca    9.0      24 Apr 2022 09:07  Mg     2.2     04-24    TPro  7.1  /  Alb  3.1<L>  /  TBili  0.5  /  DBili  x   /  AST  17  /  ALT  17  /  AlkPhos  71  04-24    COVID-19 PCR: NotDetec (24 Apr 2022 06:45)  COVID-19 PCR: NotDetec (16 Nov 2021 06:08)    CAPILLARY BLOOD GLUCOSE          Culture - Urine (collected 24 Apr 2022 10:58)  Source: Clean Catch None  Preliminary Report (25 Apr 2022 10:29):    >100,000 CFU/ml Gram Negative Rods    Urinalysis (04.24.22 @ 10:58)   pH Urine: 8.0   Glucose Qualitative, Urine: Negative   Blood, Urine: Moderate   Color: Yellow   Urine Appearance: very cloudy   Bilirubin: Negative   Ketone - Urine: Negative   Specific Gravity: 1.015   Protein, Urine: 100   Urobilinogen: Negative   Nitrite: Negative   Leukocyte Esterase Concentration: Moderate Urine Microscopic-Add On (NC) (04.24.22 @ 10:58)   Bacteria: Many   Epithelial Cells: Many   Red Blood Cell - Urine: 0-2 /HPF   White Blood Cell - Urine: >50 Lipase, Serum (04.24.22 @ 09:07)   Lipase, Serum: 54 U/L       RADIOLOGY:  < from: CT Abdomen and Pelvis w/ IV Cont (04.24.22 @ 11:28) >  IMPRESSION:  1.  No acute abdominal/pelvic pathology or primary etiology of pain.  2.  Unchanged intra-/extra hepatic biliary duct dilatation to the level   of a periampullary duodenal diverticulum.  3.  Eccentric urinary bladder mural thickening and mucosal hyperemia,   unchanged. Correlation with urinalysis recommended    < end of copied text >  < from: US Abdomen Upper Quadrant Right (04.24.22 @ 08:50) >  IMPRESSION:  Distended gallbladder with stones and sludge with concern for positive   sonographic Romero sign/acute cholecystitis.    Intra and extrahepatic biliary ductal dilatation. Correlation with   contrast-enhanced MRI/MRCP is recommended.    < end of copied text >      EKG:  < from: 12 Lead ECG (04.24.22 @ 09:04) >  Diagnosis Line Normal sinus rhythm  Left axis deviation  Minimal voltage criteria for LVH, may be normal variant  Abnormal ECG  When compared with ECG of 30-DEC-2017 09:04,  Premature ventricular complexes are no longer Present    < end of copied text >            I personally reviewed labs, imaging, ekg, orders and vitals.    Discussed case with:  [X]RN  [X]CM/INGRID  [X]Patient  [X]Family  []Specialist:        MEDICATIONS  (STANDING):  amLODIPine   Tablet 5 milliGRAM(s) Oral daily  cyanocobalamin 1000 MICROGram(s) Oral daily  dorzolamide 2%/timolol 0.5% Ophthalmic Solution 1 Drop(s) Both EYES two times a day  folic acid 1 milliGRAM(s) Oral daily  heparin   Injectable 5000 Unit(s) SubCutaneous every 8 hours  latanoprost 0.005% Ophthalmic Solution 1 Drop(s) Both EYES at bedtime  melatonin 5 milliGRAM(s) Oral at bedtime  pilocarpine 4% Solution 1 Drop(s) Both EYES two times a day  piperacillin/tazobactam IVPB.. 3.375 Gram(s) IV Intermittent every 8 hours  simvastatin 20 milliGRAM(s) Oral at bedtime    MEDICATIONS  (PRN):    
Date of service: 22 @ 08:33    Lying in bed in NAD  Lethargic and confused  Dose not seem in pain    ROS: no fever or chills; poorly verbal    MEDICATIONS  (STANDING):  amLODIPine   Tablet 5 milliGRAM(s) Oral daily  cyanocobalamin 1000 MICROGram(s) Oral daily  dorzolamide 2%/timolol 0.5% Ophthalmic Solution 1 Drop(s) Both EYES two times a day  folic acid 1 milliGRAM(s) Oral daily  heparin   Injectable 5000 Unit(s) SubCutaneous every 8 hours  latanoprost 0.005% Ophthalmic Solution 1 Drop(s) Both EYES at bedtime  melatonin 5 milliGRAM(s) Oral at bedtime  pilocarpine 4% Solution 1 Drop(s) Both EYES two times a day  piperacillin/tazobactam IVPB.. 3.375 Gram(s) IV Intermittent every 8 hours  simvastatin 20 milliGRAM(s) Oral at bedtime    Vital Signs Last 24 Hrs  T(C): 36.9 (2022 07:50), Max: 36.9 (2022 07:50)  T(F): 98.4 (2022 07:50), Max: 98.4 (2022 07:50)  HR: 62 (2022 07:50) (62 - 67)  BP: 127/50 (2022 07:50) (109/55 - 127/50)  BP(mean): --  RR: 18 (2022 07:50) (18 - 18)  SpO2: 95% (2022 07:50) (95% - 96%)     Physical exam:    Constitutional:  No acute distress  HEENT: NC/AT, EOMI, PERRLA, conjunctivae clear; ears and nose atraumatic  Neck: supple; thyroid not palpable  Back: no tenderness  Respiratory: respiratory effort normal; clear to auscultation  Cardiovascular: S1S2 regular, no murmurs  Abdomen: soft, not tender, not distended, positive BS  Genitourinary: no suprapubic tenderness  Lymphatic: no LN palpable  Musculoskeletal: no muscle tenderness, no joint swelling or tenderness  Extremities: no pedal edema  Neurological/ Psychiatric: confused, moving all extremities  Skin: no rashes; no palpable lesions    Labs: all available labs reviewed                        14.2   10.91 )-----------( 221      ( 2022 12:30 )             43.8         142  |  111<H>  |  24<H>  ----------------------------<  86  4.3   |  23  |  1.00    Ca    9.0      2022 09:07  Mg     2.2         TPro  7.1  /  Alb  3.1<L>  /  TBili  0.5  /  DBili  x   /  AST  17  /  ALT  17  /  AlkPhos  71  24     LIVER FUNCTIONS - ( 2022 09:07 )  Alb: 3.1 g/dL / Pro: 7.1 gm/dL / ALK PHOS: 71 U/L / ALT: 17 U/L / AST: 17 U/L / GGT: x           Urinalysis Basic - ( 2022 10:58 )    Color: Yellow / Appearance: very cloudy / S.015 / pH: x  Gluc: x / Ketone: Negative  / Bili: Negative / Urobili: Negative   Blood: x / Protein: 100 / Nitrite: Negative   Leuk Esterase: Moderate / RBC: 0-2 /HPF / WBC >50   Sq Epi: x / Non Sq Epi: Many / Bacteria: Many    COVID-19 PCR: NotDetec (22 @ 06:45)      Culture - Blood (collected 2022 10:58)  Source: .Blood None  Preliminary Report (2022 15:01):    No growth to date.    Culture - Urine (collected 2022 10:58)  Source: Clean Catch None  Preliminary Report (2022 10:29):    >100,000 CFU/ml Gram Negative Rods    Culture - Blood (collected 2022 10:55)  Source: .Blood None  Preliminary Report (2022 15:01):    No growth to date.    Radiology: all available radiological tests reviewed    < from: CT Abdomen and Pelvis w/ IV Cont (22 @ 11:28) >  1.  No acute abdominal/pelvic pathology or primary etiology of pain.  2.  Unchanged intra-/extra hepatic biliary duct dilatation to the level   of a periampullary duodenal diverticulum.  3.  Eccentric urinary bladder mural thickening and mucosal hyperemia,   unchanged.   < end of copied text >      Advanced directives addressed: full resuscitation
HPI: Pt seen and examined this am in follow up for sx. Pt feeling well, denies any complaints, hopeful to go home soon.       PAIN: denies    DYSPNEA: denies      ROS:  All other systems reviewed and negative      PHYSICAL EXAM:    Vital Signs Last 24 Hrs  T(C): 36.4 (28 Apr 2022 08:01), Max: 36.6 (27 Apr 2022 19:49)  T(F): 97.6 (28 Apr 2022 08:01), Max: 97.8 (27 Apr 2022 19:49)  HR: 67 (28 Apr 2022 08:01) (67 - 76)  BP: 127/61 (28 Apr 2022 08:01) (112/59 - 127/61)  RR: 16 (28 Apr 2022 08:01) (16 - 16)  SpO2: 96% (28 Apr 2022 08:01) (95% - 96%)    PPSV2: 30  %  FAST: 6e (incontinent)    General: Elderly female laying in bed, NAD  Mental Status: Aox 2 self and place  HEENT: mmm, + temporal wasting  Lungs: dec at bases  Cardiac: + s1 s2 rrr  GI: soft nt nd +bs  : incontinent  MSK/skin: moves all extremities, muscle and fat wasting in limbs  Neuro: no focal deficits    LABS: none today    Albumin: Albumin, Serum: 2.8 g/dL (04-26 @ 07:05)      Allergies    No Known Allergies    Intolerances      MEDICATIONS  (STANDING):  amLODIPine   Tablet 5 milliGRAM(s) Oral daily  chlorhexidine 4% Liquid 1 Application(s) Topical <User Schedule>  cyanocobalamin 1000 MICROGram(s) Oral daily  dorzolamide 2%/timolol 0.5% Ophthalmic Solution 1 Drop(s) Both EYES two times a day  folic acid 1 milliGRAM(s) Oral daily  heparin   Injectable 5000 Unit(s) SubCutaneous every 8 hours  latanoprost 0.005% Ophthalmic Solution 1 Drop(s) Both EYES at bedtime  melatonin 5 milliGRAM(s) Oral at bedtime  pilocarpine 4% Solution 1 Drop(s) Both EYES two times a day  piperacillin/tazobactam IVPB.. 3.375 Gram(s) IV Intermittent every 8 hours  simvastatin 20 milliGRAM(s) Oral at bedtime    MEDICATIONS  (PRN):    
CHIEF COMPLAINT: RUQ abdominal pain    INTERVAL HISTORY: Pain resolved. No dizziness, nausea, vomiting, abdominal pain, chest pain, SOB, fever. Eager to return home. Awaiting finalization of her urine culture to ensure proper antibiotic coverage. Patient and family aware that diagnosis of acute cholecystitis is considered possible, neither definitive nor ruled out. Patient and family wish to pursue very conservative care plan with course of antibiotics and observation, rather than invasive procedure such as cholecystostomy drain placement and surely they are opposed to cholecystectomy.     ROS: Multi system review is comprehensively negative x 10 systems aside from her known cognitive impairment.     VITALS:  T(F): 97.4 (27 Apr 2022 08:05), Max: 98.8 (26 Apr 2022 19:49)  HR: 58 (27 Apr 2022 08:05) (54 - 58)  BP: 129/48 (27 Apr 2022 08:05) (115/57 - 129/48)  RR: 16 (27 Apr 2022 08:05) (16 - 16)  SpO2: 95% (27 Apr 2022 08:05) (95% - 96%)    EXAM:  General: Seen OOB in chair, comfortable appearing  HEENT: NCAT PERRL EOMI   Neck: Supple  Chest: Normal resp effort, lungs clear  CVS: S1 S2 normal, RRR  Abd: +BS soft NT ND   Ext: No edema or calf tenderness  Skin: Warm dry  Neuro: Awake and alert, follows simple commands  Mood: Calm, pleasant    LABS:                       13.7   9.94 )-----------( 201             41.8       139  |  106  |  42  ----------------------<  97  3.6   |   22   |  1.46    Ca 8.4   Phos 3.6   Mg 2.2    TPro  6.8  /  Alb  2.8  /  TBili  0.5  /  DBili  x   /  AST  15  /  ALT  13  /  AlkPhos  60      MICRO:  Urine culture 4/24: > 100,000 CFU/mL gram negative rods  Blood culture 4/24: Negative  COVID19 PCR 4/24: Negative    RADIOLOGY:  CT Abdomen and Pelvis w/ IV Cont: No acute abdominal/pelvic pathology or primary etiology of pain.  Unchanged intra-/extra hepatic biliary duct dilatation to the level of a periampullary duodenal diverticulum. Eccentric urinary bladder mural thickening and mucosal hyperemia, unchanged.    US Abdomen RUQ: Distended gallbladder with stones and sludge with concern for positive sonographic Romero sign/acute cholecystitis. Intra and extrahepatic biliary ductal dilatation.     CARDIAC TESTING:  EKG: Normal sinus rhythm. Left axis deviation. Minimal voltage criteria for LVH    MEDS:  MEDICATIONS  (STANDING):  amLODIPine   Tablet 5 milliGRAM(s) Oral daily  chlorhexidine 4% Liquid 1 Application(s) Topical <User Schedule>  cyanocobalamin 1000 MICROGram(s) Oral daily  dorzolamide 2%/timolol 0.5% Ophthalmic Solution 1 Drop(s) Both EYES two times a day  folic acid 1 milliGRAM(s) Oral daily  heparin   Injectable 5000 Unit(s) SubCutaneous every 8 hours  latanoprost 0.005% Ophthalmic Solution 1 Drop(s) Both EYES at bedtime  melatonin 5 milliGRAM(s) Oral at bedtime  pilocarpine 4% Solution 1 Drop(s) Both EYES two times a day  piperacillin/tazobactam IVPB.. 3.375 Gram(s) IV Intermittent every 8 hours  simvastatin 20 milliGRAM(s) Oral at bedtime    
Date of service: 22 @ 08:56    Lying in bed in NAD  Lethargic, but arousable  Dose not seem in pain    ROS: no fever or chills; poorly verbal    MEDICATIONS  (STANDING):  amLODIPine   Tablet 5 milliGRAM(s) Oral daily  cyanocobalamin 1000 MICROGram(s) Oral daily  dorzolamide 2%/timolol 0.5% Ophthalmic Solution 1 Drop(s) Both EYES two times a day  folic acid 1 milliGRAM(s) Oral daily  heparin   Injectable 5000 Unit(s) SubCutaneous every 8 hours  latanoprost 0.005% Ophthalmic Solution 1 Drop(s) Both EYES at bedtime  melatonin 5 milliGRAM(s) Oral at bedtime  pilocarpine 4% Solution 1 Drop(s) Both EYES two times a day  piperacillin/tazobactam IVPB.. 3.375 Gram(s) IV Intermittent every 8 hours  simvastatin 20 milliGRAM(s) Oral at bedtime    Vital Signs Last 24 Hrs  T(C): 36.3 (2022 08:05), Max: 37.1 (2022 19:49)  T(F): 97.4 (2022 08:05), Max: 98.8 (2022 19:49)  HR: 58 (2022 08:05) (54 - 79)  BP: 129/48 (2022 08:05) (113/48 - 129/48)  BP(mean): --  RR: 16 (2022 08:05) (16 - 16)  SpO2: 95% (2022 08:05) (95% - 97%)     Physical exam:    Constitutional:  No acute distress  HEENT: NC/AT, EOMI, PERRLA, conjunctivae clear; ears and nose atraumatic  Neck: supple; thyroid not palpable  Back: no tenderness  Respiratory: respiratory effort normal; clear to auscultation  Cardiovascular: S1S2 regular, no murmurs  Abdomen: soft, not tender, not distended, positive BS  Genitourinary: no suprapubic tenderness  Lymphatic: no LN palpable  Musculoskeletal: no muscle tenderness, no joint swelling or tenderness  Extremities: no pedal edema  Neurological/ Psychiatric: confused, moving all extremities  Skin: no rashes; no palpable lesions    Labs: reviewed                        13.7   9.94  )-----------( 201      ( 2022 07:05 )             41.8     04-26    139  |  106  |  42<H>  ----------------------------<  97  3.6   |  22  |  1.46<H>    Ca    8.4<L>      2022 07:05  Phos  3.6     04-  Mg     2.2     04-    TPro  6.8  /  Alb  2.8<L>  /  TBili  0.5  /  DBili  x   /  AST  15  /  ALT  13  /  AlkPhos  60                          14.2   10.91 )-----------( 221      ( 2022 12:30 )             43.8     04-24    142  |  111<H>  |  24<H>  ----------------------------<  86  4.3   |  23  |  1.00    Ca    9.0      2022 09:07  Mg     2.2     04-24    TPro  7.1  /  Alb  3.1<L>  /  TBili  0.5  /  DBili  x   /  AST  17  /  ALT  17  /  AlkPhos  71  04-24     LIVER FUNCTIONS - ( 2022 09:07 )  Alb: 3.1 g/dL / Pro: 7.1 gm/dL / ALK PHOS: 71 U/L / ALT: 17 U/L / AST: 17 U/L / GGT: x           Urinalysis Basic - ( 2022 10:58 )    Color: Yellow / Appearance: very cloudy / S.015 / pH: x  Gluc: x / Ketone: Negative  / Bili: Negative / Urobili: Negative   Blood: x / Protein: 100 / Nitrite: Negative   Leuk Esterase: Moderate / RBC: 0-2 /HPF / WBC >50   Sq Epi: x / Non Sq Epi: Many / Bacteria: Many    COVID-19 PCR: NotDetec (22 @ 06:45)      Culture - Blood (collected 2022 10:58)  Source: .Blood None  Preliminary Report (2022 15:01):    No growth to date.    Culture - Urine (collected 2022 10:58)  Source: Clean Catch None  Preliminary Report (2022 10:29):    >100,000 CFU/ml Gram Negative Rods    Culture - Blood (collected 2022 10:55)  Source: .Blood None  Preliminary Report (2022 15:01):    No growth to date.    Radiology: all available radiological tests reviewed    < from: CT Abdomen and Pelvis w/ IV Cont (22 @ 11:28) >  1.  No acute abdominal/pelvic pathology or primary etiology of pain.  2.  Unchanged intra-/extra hepatic biliary duct dilatation to the level   of a periampullary duodenal diverticulum.  3.  Eccentric urinary bladder mural thickening and mucosal hyperemia,   unchanged.   < end of copied text >      Advanced directives addressed: full resuscitation
Patient was seen and evaluated at bedside this morning. No acute events overnight. No f/c/cp/sob/n/v. VS reviewed.    Vital Signs Last 24 Hrs  T(C): 36.9 (2022 19:58), Max: 36.9 (2022 10:25)  T(F): 98.5 (2022 19:58), Max: 98.5 (2022 10:25)  HR: 89 (2022 19:58) (69 - 89)  BP: 160/86 (2022 19:58) (158/97 - 187/76)  BP(mean): 108 (2022 15:56) (108 - 108)  RR: 18 (2022 19:58) (18 - 18)  SpO2: 93% (2022 19:58) (92% - 99%)    Physical Exam:  General Appearance: Appears well, NAD  Neck: Supple  Chest: Equal expansion bilaterally, equal breath sounds  CV: Pulse regular presently  Abdomen: Soft, nontense, non tender  Extremities: Grossly symmetric, SCD's in place     I&O's Summary    I&O's Detail      MEDICATIONS  (STANDING):  amLODIPine   Tablet 5 milliGRAM(s) Oral daily  cyanocobalamin 1000 MICROGram(s) Oral daily  dorzolamide 2%/timolol 0.5% Ophthalmic Solution 1 Drop(s) Both EYES two times a day  folic acid 1 milliGRAM(s) Oral daily  heparin   Injectable 5000 Unit(s) SubCutaneous every 8 hours  latanoprost 0.005% Ophthalmic Solution 1 Drop(s) Both EYES at bedtime  melatonin 5 milliGRAM(s) Oral at bedtime  pilocarpine 4% Solution 1 Drop(s) Both EYES two times a day  piperacillin/tazobactam IVPB.. 3.375 Gram(s) IV Intermittent every 8 hours  simvastatin 20 milliGRAM(s) Oral at bedtime    MEDICATIONS  (PRN):      LABS:                        14.5   10.86 )-----------( 190      ( 2022 09:07 )             46.6     04-24    142  |  111<H>  |  24<H>  ----------------------------<  86  4.3   |  23  |  1.00    Ca    9.0      2022 09:07  Mg     2.2     -24    TPro  7.1  /  Alb  3.1<L>  /  TBili  0.5  /  DBili  x   /  AST  17  /  ALT  17  /  AlkPhos  71  04-24    PT/INR - ( 2022 09:08 )   PT: 12.5 sec;   INR: 1.08 ratio         PTT - ( 2022 09:08 )  PTT:28.4 sec  Urinalysis Basic - ( 2022 10:58 )    Color: Yellow / Appearance: very cloudy / S.015 / pH: x  Gluc: x / Ketone: Negative  / Bili: Negative / Urobili: Negative   Blood: x / Protein: 100 / Nitrite: Negative   Leuk Esterase: Moderate / RBC: 0-2 /HPF / WBC >50   Sq Epi: x / Non Sq Epi: Many / Bacteria: Many        RADIOLOGY & ADDITIONAL STUDIES:
CHIEF COMPLAINT: RUQ abdominal pain    INTERVAL HISTORY: Pain resolved. No dizziness, nausea, vomiting, abdominal pain, chest pain, SOB, fever. Limited in the setting of underlying dementia    ROS: 14 Point review of systems reviewed and reported as negative unless otherwise stated in HPI however also limited in the setting of underlying dementia    VITALS:  T(F): 98.4 (26 Apr 2022 07:50), Max: 98.4 (26 Apr 2022 07:50)  HR: 79 (26 Apr 2022 10:12) (62 - 79)  BP: 113/48 (26 Apr 2022 10:12) (109/55 - 127/50)  RR: 16 (26 Apr 2022 10:12) (16 - 18)  SpO2: 97% (26 Apr 2022 10:12) (95% - 97%)PHYSICAL EXAM:    EXAM:  General: Seen OOB in chair, comfortable appearing  HEENT: NCAT PERRL EOMI   Neck: Supple  Chest: Normal resp effort, lungs clear  CVS: S1 S2 normal, RRR  Abd: +BS soft NT ND   Ext: No edema or calf tenderness  Skin: Warm dry  Neuro: Awake and alert, follows simple commands  Mood: Calm, pleasant    LABS:                       13.7   9.94 )-----------( 201             41.8       139  |  106  |  42  ----------------------<  97  3.6   |   22   |  1.46    Ca 8.4   Phos 3.6   Mg 2.2    TPro  6.8  /  Alb  2.8  /  TBili  0.5  /  DBili  x   /  AST  15  /  ALT  13  /  AlkPhos  60      RADIOLOGY:  CT Abdomen and Pelvis w/ IV Cont: No acute abdominal/pelvic pathology or primary etiology of pain.  Unchanged intra-/extra hepatic biliary duct dilatation to the level of a periampullary duodenal diverticulum. Eccentric urinary bladder mural thickening and mucosal hyperemia, unchanged.    US Abdomen RUQ: Distended gallbladder with stones and sludge with concern for positive sonographic Romero sign/acute cholecystitis. Intra and extrahepatic biliary ductal dilatation.     CARDIAC TESTING:  EKG: Normal sinus rhythm. Left axis deviation. Minimal voltage criteria for LVH    MEDS:  MEDICATIONS  (STANDING):  amLODIPine   Tablet 5 milliGRAM(s) Oral daily  cyanocobalamin 1000 MICROGram(s) Oral daily  dorzolamide 2%/timolol 0.5% Ophthalmic Solution 1 Drop(s) Both EYES two times a day  folic acid 1 milliGRAM(s) Oral daily  heparin   Injectable 5000 Unit(s) SubCutaneous every 8 hours  latanoprost 0.005% Ophthalmic Solution 1 Drop(s) Both EYES at bedtime  melatonin 5 milliGRAM(s) Oral at bedtime  pilocarpine 4% Solution 1 Drop(s) Both EYES two times a day  piperacillin/tazobactam IVPB.. 3.375 Gram(s) IV Intermittent every 8 hours  simvastatin 20 milliGRAM(s) Oral at bedtime    
HPI: Pt seen and examined this am in follow up for sx, daughter at bedside. Pt sitting up in chair, feeling well, noting only slight right neck discomfort only wanting hot pack (thinks "twisted it funny". Confirmed with family that they are awaiting outcome of N home hospice referral and aware that primary team awaiting Ucx results. Dr. Macdonald joined at bedside confirming the same.       PAIN: as above    DYSPNEA: denies      ROS:  All other systems reviewed and negative      PHYSICAL EXAM:    Vital Signs Last 24 Hrs  T(C): 36.3 (27 Apr 2022 08:05), Max: 37.1 (26 Apr 2022 19:49)  T(F): 97.4 (27 Apr 2022 08:05), Max: 98.8 (26 Apr 2022 19:49)  HR: 58 (27 Apr 2022 08:05) (54 - 58)  BP: 129/48 (27 Apr 2022 08:05) (115/57 - 129/48)  RR: 16 (27 Apr 2022 08:05) (16 - 16)  SpO2: 95% (27 Apr 2022 08:05) (95% - 96%)    PPSV2: 30  %  FAST: 6e (incontinent)    General: Elderly female sitting up in bed, smiling, NAD  Mental Status: Aox 2 self and place  HEENT: mmm, + temporal wasting  Lungs: dec at bases  Cardiac: + s1 s2 rrr  GI: soft nt nd +bs  : incontinent  MSK/skin: moves all extremities, muscle and fat wasting in limbs  Neuro: no focal deficits      LABS:                        13.7   9.94  )-----------( 201      ( 26 Apr 2022 07:05 )             41.8     04-26    139  |  106  |  42<H>  ----------------------------<  97  3.6   |  22  |  1.46<H>    Ca    8.4<L>      26 Apr 2022 07:05  Phos  3.6     04-26  Mg     2.2     04-26    TPro  6.8  /  Alb  2.8<L>  /  TBili  0.5  /  DBili  x   /  AST  15  /  ALT  13  /  AlkPhos  60  04-26      Albumin: Albumin, Serum: 2.8 g/dL (04-26 @ 07:05)      Allergies    No Known Allergies    Intolerances      MEDICATIONS  (STANDING):  amLODIPine   Tablet 5 milliGRAM(s) Oral daily  chlorhexidine 4% Liquid 1 Application(s) Topical <User Schedule>  cyanocobalamin 1000 MICROGram(s) Oral daily  dorzolamide 2%/timolol 0.5% Ophthalmic Solution 1 Drop(s) Both EYES two times a day  folic acid 1 milliGRAM(s) Oral daily  heparin   Injectable 5000 Unit(s) SubCutaneous every 8 hours  latanoprost 0.005% Ophthalmic Solution 1 Drop(s) Both EYES at bedtime  melatonin 5 milliGRAM(s) Oral at bedtime  pilocarpine 4% Solution 1 Drop(s) Both EYES two times a day  piperacillin/tazobactam IVPB.. 3.375 Gram(s) IV Intermittent every 8 hours  simvastatin 20 milliGRAM(s) Oral at bedtime    MEDICATIONS  (PRN):

## 2022-04-28 NOTE — DISCHARGE NOTE PROVIDER - NSDCMRMEDTOKEN_GEN_ALL_CORE_FT
amLODIPine 5 mg oral tablet: 1 tab(s) orally once a day  Aspir 81 oral delayed release tablet: 1 tab(s) orally once a day  cefuroxime 250 mg oral tablet: 1 tab(s) orally 2 times a day   dorzolamide-timolol 2.23%-0.68% ophthalmic solution: 1 drop(s) to each affected eye 2 times a day  folic acid 1 mg oral tablet: 1 tab(s) orally once a day  latanoprost 0.005% ophthalmic solution: 1 drop(s) to each affected eye once a day (in the evening)  metroNIDAZOLE 500 mg oral tablet: 1 tab(s) orally 3 times a day   Moderna COVID-19 (Booster Only) Vaccine PF 50 mcg/0.5 mL intramuscular suspension:   pilocarpine 4% ophthalmic solution: 1 dose(s) to each affected eye 2 times a day  simvastatin 20 mg oral tablet: 1 tab(s) orally once a day (at bedtime)  Slow Magnesium Chloride with Calcium 70 mg-117 mg oral delayed release tablet: 2 tab(s) orally once a day  Tylenol 500 mg oral tablet: 1 tab(s) orally 2 times a day, As Needed  Vitamin B-12 1000 mcg oral tablet: 1 tab(s) orally once a day

## 2022-04-28 NOTE — DISCHARGE NOTE PROVIDER - HOSPITAL COURSE
89 year-old woman with HTN, hearing impairment, hx of breast cancer, cognitive impairment, presented with RUQ pain x 3 days, found to have biliary sludge and stones as well as positive sonographic Romero's. LFTs normal, bili WNL. Recommended MRCP for further evaluation however patient and family deferred MRCP, requested conservative management, that being a trial of antibiotics and observation. No interventions including cholecystostomy drain placement. She was also noted to have urine culture positive for >100,000 CFU/mL GNRs. On Zosyn, day 5 today and clinically stable.     Cholelithiasis and biliary sludge with possible acute cholecystitis  Unable to definitively r/o acute cholecystitis as patient/family wished to defer MRCP/HIDA scan, preferring conservative management with antibiotics and observation, did not want to proceed with cholecystostomy drain if found to be indicated. Reassuringly, no fever, no pain, tolerating PO. No leukocytosis. No hyperbilirubinemia, LFTs normal. On Zosyn, day 5. Tolerating PO. Patient to return home today with transition to hospice care. Zosyn switched to Ceftin and Flagyl today to complete 5 more days.     Possible UTI vs asymptomatic bacteriuria  UCx with > 100,000 CFU/mL GNRs. On Zosyn, day 5, essentially completes course today for urinary coverage assuming susceptible organism. Will continue to follow in-process urine culture.     Asymptomatic bradycardia  No need for PPM at this time as per EP. Now NSR, normal rate, no further pauses.     Likely cognitive impairment  Reorient frequently    HTN  BP controlled. Switched home metoprolol to norvasc due to episodes of bradycardia this admission.    Severe protein calorie malnutrition  Nutrition input appreciated. Encourage po. Trend wt      DISCHARGE EXAM:  Afebrile  /60  HR 60  RR 16  O2 97% on room air  General: Seen OOB in chair, comfortable appearing  HEENT: NCAT PERRL EOMI   Neck: Supple  Chest: Normal resp effort, lungs clear  CVS: S1 S2 normal, RRR  Abd: +BS soft NT ND   Ext: No edema or calf tenderness  Skin: Warm dry  Neuro: Awake and alert, follows simple commands  Mood: Calm, pleasant    LABS:                       13.7   9.94 )-----------( 201             41.8       139  |  106  |  42  ----------------------<  97  3.6   |   22   |  1.46    Ca 8.4   Phos 3.6   Mg 2.2    TPro  6.8  /  Alb  2.8  /  TBili  0.5  /  DBili  x   /  AST  15  /  ALT  13  /  AlkPhos  60      MICRO:  Urine culture 4/24: > 100,000 CFU/mL gram negative rods  Blood culture 4/24: Negative  COVID19 PCR 4/24: Negative    RADIOLOGY:  CT Abdomen and Pelvis w/ IV Cont: No acute abdominal/pelvic pathology or primary etiology of pain.  Unchanged intra-/extra hepatic biliary duct dilatation to the level of a periampullary duodenal diverticulum. Eccentric urinary bladder mural thickening and mucosal hyperemia, unchanged.    US Abdomen RUQ: Distended gallbladder with stones and sludge with concern for positive sonographic Romero sign/acute cholecystitis. Intra and extrahepatic biliary ductal dilatation.     CARDIAC TESTING:  EKG: Normal sinus rhythm. Left axis deviation. Minimal voltage criteria for LVH   89 year-old woman with HTN, hearing impairment, hx of breast cancer, cognitive impairment, presented with RUQ pain x 3 days, found to have biliary sludge and stones as well as positive sonographic Romero's. LFTs normal, bili WNL. Recommended MRCP for further evaluation however patient and family deferred MRCP, requested conservative management, that being a trial of antibiotics and observation. No interventions including cholecystostomy drain placement. She was also noted to have urine culture positive for >100,000 CFU/mL GNRs. On Zosyn, day 5 today and clinically stable.     Cholelithiasis and biliary sludge with possible acute cholecystitis  Unable to definitively r/o acute cholecystitis as patient/family wished to defer MRCP/HIDA scan, preferring conservative management with antibiotics and observation, did not want to proceed with cholecystostomy drain if found to be indicated. Reassuringly, no fever, no pain, tolerating PO. No leukocytosis. No hyperbilirubinemia, LFTs normal. On Zosyn, day 5. Tolerating PO. Patient to return home today with transition to hospice care. Zosyn switched to Ceftin and Flagyl today to complete 5 more days.     Possible UTI vs asymptomatic bacteriuria  UCx with > 100,000 CFU/mL GNRs. On Zosyn, day 5, essentially completes course today for urinary coverage assuming susceptible organism. Will continue to follow in-process urine culture.     Metabolic encephalopathy in setting of probable dementia/cognitive impairment  Clinically improved since admission. Now at baseline.     Asymptomatic bradycardia  No need for PPM at this time as per EP. Now NSR, normal rate, no further pauses.     Likely cognitive impairment  Reorient frequently    HTN  BP controlled. Switched home metoprolol to norvasc due to episodes of bradycardia this admission.    Severe protein calorie malnutrition  Nutrition input appreciated. Encourage po. Trend wt      DISCHARGE EXAM:  Afebrile  /60  HR 60  RR 16  O2 97% on room air  General: Seen OOB in chair, comfortable appearing  HEENT: NCAT PERRL EOMI   Neck: Supple  Chest: Normal resp effort, lungs clear  CVS: S1 S2 normal, RRR  Abd: +BS soft NT ND   Ext: No edema or calf tenderness  Skin: Warm dry  Neuro: Awake and alert, follows simple commands  Mood: Calm, pleasant    LABS:                       13.7   9.94 )-----------( 201             41.8       139  |  106  |  42  ----------------------<  97  3.6   |   22   |  1.46    Ca 8.4   Phos 3.6   Mg 2.2    TPro  6.8  /  Alb  2.8  /  TBili  0.5  /  DBili  x   /  AST  15  /  ALT  13  /  AlkPhos  60      MICRO:  Urine culture 4/24: > 100,000 CFU/mL gram negative rods  Blood culture 4/24: Negative  COVID19 PCR 4/24: Negative    RADIOLOGY:  CT Abdomen and Pelvis w/ IV Cont: No acute abdominal/pelvic pathology or primary etiology of pain.  Unchanged intra-/extra hepatic biliary duct dilatation to the level of a periampullary duodenal diverticulum. Eccentric urinary bladder mural thickening and mucosal hyperemia, unchanged.    US Abdomen RUQ: Distended gallbladder with stones and sludge with concern for positive sonographic Romero sign/acute cholecystitis. Intra and extrahepatic biliary ductal dilatation.     CARDIAC TESTING:  EKG: Normal sinus rhythm. Left axis deviation. Minimal voltage criteria for LVH

## 2022-04-28 NOTE — DISCHARGE NOTE PROVIDER - NSDCHHHOMEBOUNDOTHER_GEN_ALL_CORE_FT
Transitioning to home hospice upon discharge. Has dementia with severe protein calorie malnutrition, deconditioning and debility.

## 2022-04-28 NOTE — DISCHARGE NOTE PROVIDER - DETAILS OF MALNUTRITION DIAGNOSIS/DIAGNOSES
This patient has been assessed with a concern for Malnutrition and was treated during this hospitalization for the following Nutrition diagnosis/diagnoses:     -  04/25/2022: Severe protein-calorie malnutrition

## 2022-04-28 NOTE — DISCHARGE NOTE PROVIDER - CARE PROVIDER_API CALL
[facility]ARE [facility],   Phone: [phone]  Fax: (   )    -  Follow Up Time:     Ana Fletcher)  [address], [address] [place] [address]  Phone: [phone]  Fax: [phone]  Established Patient  Follow Up Time: 1 week

## 2022-04-28 NOTE — PROGRESS NOTE ADULT - ASSESSMENT
89y old Female with hx of dementia (FAST 6e), HTN, breast ca, admitted on 4/24 for RUQ pains. Found to have CT with + CBD dilatation unchanged from 2017., with concern for cholecystitis and +UA. Course c/b episodes of pauses on EKG in ED, and per daughter patient is DNR/DNI and does not want PPM. Palliative Care consulted to assist with establishing GOC.     1) Abd pain/kimmie  - concern for cholecystitis, in setting of chronic CBD dilation and initial pain  - no longer in pain  - surgical notes appreciated- NPO, suggesting GI cc: need for MRCP  - GI notes appreciated- chronic CBD dilation, unchanged on CT, unlikely cholecystitis more likely UTI  - MRCP only recommended if pt worsening clinically, which she is not, so this was cancelled  - family less interested in procedures, glad that this was cancelled    2) UTI  - UA positive, Cx growing gram negative rods  - ID consulted   - on IV abx, awaiting susceptibilities    3) Bradycardia/pauses  - family aware  - do not want PPM  - confirmed DNR, understanding that this may worsen with no procedure that they would want pt to have. Pt also able to say that she does not want any surgical procedures    4) Debility/Dementia  - PPS<40%  - care coordination notes appreciated- pt lives at home with 24hr aids  - dietary notes appreciated- severe protein calorie malnutrition documented  - dementia, mod- nearing severe FAST 6e (incontinent)--> if sundowning can consider seroquel 25mg qhs prn    5) Prognosis  - poor  - in setting of advanced age, advancing dementia (FAST 6e, incontinent, wears diapers), severe protein calorie malnutrition, PPS<40% (24hr aids), likely hospice eligible.     6) GOC/Advanced Directives  - pt does not have capacity for decision making, though she is able to give an opinion at times on if she wants certain interventions  - HCP on file naming 1: Venkat Richelle [phone] and 2) Magali Lindsay [phone]  - MOLST: DNR and DNI   - GOC conversation held 4/25- plan for [facility] home hospice referral, while treating UTI, [facility] could not accommodate request and family asked for [facility] referral, placed. As per SW notes, pt accepted for home hospice services    Thank you for including us in Ms. Peoples's care. Will continue to follow with you.    Rianna Munguia MD  Palliative Care Attending

## 2022-04-28 NOTE — PROGRESS NOTE ADULT - PROVIDER SPECIALTY LIST ADULT
Hospitalist
Hospitalist
Palliative Care
Surgery
Palliative Care
Infectious Disease
Hospitalist

## 2022-04-28 NOTE — DISCHARGE NOTE PROVIDER - PROVIDER TOKENS
FREE:[LAST:[HOSPICE CARE NETWORK],PHONE:[phone]],FAX:[(   )    -]],PROVIDER:[TOKEN:[[id number]],FOLLOWUP:[1 week],ESTABLISHEDPATIENT:[T]]

## 2022-04-28 NOTE — DISCHARGE NOTE NURSING/CASE MANAGEMENT/SOCIAL WORK - NSDCPEFALRISK_GEN_ALL_CORE
For information on Fall & Injury Prevention, visit: [email or web address] OR  [email or web address] OR  https://www.cdc.gov/steadi/patient.html

## 2022-04-29 LAB
CULTURE RESULTS: SIGNIFICANT CHANGE UP
CULTURE RESULTS: SIGNIFICANT CHANGE UP
SPECIMEN SOURCE: SIGNIFICANT CHANGE UP
SPECIMEN SOURCE: SIGNIFICANT CHANGE UP

## 2022-04-30 LAB
-  AMIKACIN: SIGNIFICANT CHANGE UP
-  AMOXICILLIN/CLAVULANIC ACID: SIGNIFICANT CHANGE UP
-  AMPICILLIN/SULBACTAM: SIGNIFICANT CHANGE UP
-  AMPICILLIN: SIGNIFICANT CHANGE UP
-  AMPICILLIN: SIGNIFICANT CHANGE UP
-  AZTREONAM: SIGNIFICANT CHANGE UP
-  CEFAZOLIN: SIGNIFICANT CHANGE UP
-  CEFEPIME: SIGNIFICANT CHANGE UP
-  CEFOXITIN: SIGNIFICANT CHANGE UP
-  CEFTRIAXONE: SIGNIFICANT CHANGE UP
-  CIPROFLOXACIN: SIGNIFICANT CHANGE UP
-  CIPROFLOXACIN: SIGNIFICANT CHANGE UP
-  ERTAPENEM: SIGNIFICANT CHANGE UP
-  GENTAMICIN: SIGNIFICANT CHANGE UP
-  IMIPENEM: SIGNIFICANT CHANGE UP
-  LEVOFLOXACIN: SIGNIFICANT CHANGE UP
-  LEVOFLOXACIN: SIGNIFICANT CHANGE UP
-  MEROPENEM: SIGNIFICANT CHANGE UP
-  NITROFURANTOIN: SIGNIFICANT CHANGE UP
-  NITROFURANTOIN: SIGNIFICANT CHANGE UP
-  PIPERACILLIN/TAZOBACTAM: SIGNIFICANT CHANGE UP
-  TETRACYCLINE: SIGNIFICANT CHANGE UP
-  TIGECYCLINE: SIGNIFICANT CHANGE UP
-  TOBRAMYCIN: SIGNIFICANT CHANGE UP
-  TRIMETHOPRIM/SULFAMETHOXAZOLE: SIGNIFICANT CHANGE UP
-  VANCOMYCIN: SIGNIFICANT CHANGE UP
CULTURE RESULTS: SIGNIFICANT CHANGE UP
METHOD TYPE: SIGNIFICANT CHANGE UP
METHOD TYPE: SIGNIFICANT CHANGE UP
ORGANISM # SPEC MICROSCOPIC CNT: SIGNIFICANT CHANGE UP
SPECIMEN SOURCE: SIGNIFICANT CHANGE UP

## 2022-05-04 NOTE — CDI QUERY NOTE - NSCDIOTHERTXTBX_GEN_ALL_CORE_HH
Please clarify the type of encephalopathy if known?  A) Metabolic encephalopathy superimposed on dementia  B) Encephalopathy ruled out  C) Unable to determine  D) Other ( Please specify)    CHART DOCUMENTATION:    Hospitalist progress note on 4/25/2022:  Mild Dementia  Personal History of Breast Ca  Chronic Low Back Pain  HTN      Infectious disease documentation on 4/28/2022:  Alert and confused   Acute cholecystitis. Pyuria. UTI with GNR. Bradycardia.   -encephalopathy improving  -mild leukocytosis  -BC x 2, urine c/s noted  -noted with CBD dilatation  -on zosyn 3.375 gm IV q9h # 4    Discharge documentation on 4/28/2022:  Cholelithiasis and biliary sludge with possible acute cholecystitis  Possible UTI vs asymptomatic bacteriuria    Likely cognitive impairment  Reorient frequently

## 2022-05-11 DIAGNOSIS — E43 UNSPECIFIED SEVERE PROTEIN-CALORIE MALNUTRITION: ICD-10-CM

## 2022-05-11 DIAGNOSIS — I10 ESSENTIAL (PRIMARY) HYPERTENSION: ICD-10-CM

## 2022-05-11 DIAGNOSIS — Z85.038 PERSONAL HISTORY OF OTHER MALIGNANT NEOPLASM OF LARGE INTESTINE: ICD-10-CM

## 2022-05-11 DIAGNOSIS — Z90.11 ACQUIRED ABSENCE OF RIGHT BREAST AND NIPPLE: ICD-10-CM

## 2022-05-11 DIAGNOSIS — H91.93 UNSPECIFIED HEARING LOSS, BILATERAL: ICD-10-CM

## 2022-05-11 DIAGNOSIS — N39.0 URINARY TRACT INFECTION, SITE NOT SPECIFIED: ICD-10-CM

## 2022-05-11 DIAGNOSIS — Z79.82 LONG TERM (CURRENT) USE OF ASPIRIN: ICD-10-CM

## 2022-05-11 DIAGNOSIS — F03.90 UNSPECIFIED DEMENTIA WITHOUT BEHAVIORAL DISTURBANCE: ICD-10-CM

## 2022-05-11 DIAGNOSIS — R00.1 BRADYCARDIA, UNSPECIFIED: ICD-10-CM

## 2022-05-11 DIAGNOSIS — Z66 DO NOT RESUSCITATE: ICD-10-CM

## 2022-05-11 DIAGNOSIS — G93.41 METABOLIC ENCEPHALOPATHY: ICD-10-CM

## 2022-05-11 DIAGNOSIS — R53.81 OTHER MALAISE: ICD-10-CM

## 2022-05-11 DIAGNOSIS — Z85.3 PERSONAL HISTORY OF MALIGNANT NEOPLASM OF BREAST: ICD-10-CM

## 2022-05-11 DIAGNOSIS — K80.00 CALCULUS OF GALLBLADDER WITH ACUTE CHOLECYSTITIS WITHOUT OBSTRUCTION: ICD-10-CM

## 2023-03-15 NOTE — ED ADULT NURSE NOTE - IN THE PAST 12 MONTHS HAVE YOU USED DRUGS OTHER THAN THOSE REQUIRED FOR MEDICAL REASON?
Spoke to patient.  Patient is aware of below recommendations made by provider.  Patient verbalized understanding and has no further questions at this time.     Patient is scheduled for BP Check   No

## 2023-08-07 NOTE — ED PROVIDER NOTE - NS ED ROS FT
Review of Systems:  	•	CONSTITUTIONAL: no fever  	•	SKIN: L tibial skin tear  	•	RESPIRATORY: no shortness of breath  	•	CARDIAC: no chest pain  	•	GI:  no abd pain, no nausea, no vomiting, no diarrhea  	•	GENITO-URINARY:  no dysuria  	•	MUSCULOSKELETAL:  no back pain  	•	NEUROLOGIC: no weakness  	•	ALLERGY: no rhinorrhea  	•	PSYSCHIATRIC: appropriate concern about symptoms
.

## 2024-03-28 NOTE — ED PROVIDER NOTE - TOBACCO USE
Information/update noted  
Spoke with pt.    Pt states that LMP started 2/26/2024 has not stopped of lightened up since then. Pt goes through 5 overnight pads daily, would bleed through pads/ clothes at beginning of cycle. Pt has complaints of fatigue, passing ping pong sized clots in the beginning of cycle, smaller now. Pt denies abdominal pain, feeling dizzy/ faint/ passing out, pallor, not on blood thinners, not passing tissue, shortness of breath, chest pain, headaches or difficulty breathing.    Pt scheduled for follow up 4/2/2024 1130.  
Unknown if ever smoked

## 2024-09-15 NOTE — PHYSICAL THERAPY INITIAL EVALUATION ADULT - WEIGHT-BEARING RESTRICTIONS: GAIT, REHAB EVAL
full weight-bearing
PAST MEDICAL HISTORY:  Anemia     CVA (cerebrovascular accident)     Depression     HTN (hypertension)     TIA (transient ischemic attack)     Type 2 diabetes mellitus without complication, without long-term current use of insulin

## 2024-11-19 NOTE — ED PROVIDER NOTE - WR ORDER DATE AND TIME 1
Bed/Stretcher in lowest position, wheels locked, appropriate side rails in place/Call bell, personal items and telephone in reach/Instruct patient to call for assistance before getting out of bed/chair/stretcher/Non-slip footwear applied when patient is off stretcher/[place] to call system/Physically safe environment - no spills, clutter or unnecessary equipment/Purposeful proactive rounding/Room/bathroom lighting operational, light cord in reach 16-Nov-2021 06:27

## 2025-04-01 NOTE — ED PROVIDER NOTE - CARE PLAN
Detail Level: Detailed Sunscreen Recommendations: Isdin Eryfotona Detail Level: Zone Principal Discharge DX:	Skin tear of forearm without complication, left, initial encounter  Secondary Diagnosis:	Fall, initial encounter

## 2025-04-30 NOTE — DIETITIAN INITIAL EVALUATION ADULT - NSICDXPASTMEDICALHX_GEN_ALL_CORE_FT
your upcoming schedule please reach out to your care team through [employer] or call [phone].    Please notify your assigned Nurse Navigator of any unplanned hospital admissions or Emergency Room visits within 24 hours of discharge.    -------------------------------------------------------------------------------------------------------------------  Please call our office at [phone] if you have any  of the following symptoms:   Fever of 100.5 or greater  Chills  Shortness of breath  Swelling or pain in one leg    After office hours an answering service is available and will contact a provider for emergencies or if you are experiencing any of the above symptoms.    
PAST MEDICAL HISTORY:  Breast cancer Masectomy: Right    Cancer